# Patient Record
Sex: MALE | Race: WHITE | HISPANIC OR LATINO | URBAN - METROPOLITAN AREA
[De-identification: names, ages, dates, MRNs, and addresses within clinical notes are randomized per-mention and may not be internally consistent; named-entity substitution may affect disease eponyms.]

---

## 2019-08-06 ENCOUNTER — INPATIENT (INPATIENT)
Facility: HOSPITAL | Age: 54
LOS: 1 days | Discharge: HOME | DRG: 69 | End: 2019-08-08
Attending: PSYCHIATRY & NEUROLOGY | Admitting: PSYCHIATRY & NEUROLOGY
Payer: COMMERCIAL

## 2019-08-06 VITALS
RESPIRATION RATE: 18 BRPM | WEIGHT: 190.04 LBS | OXYGEN SATURATION: 98 % | HEIGHT: 75 IN | TEMPERATURE: 98 F | DIASTOLIC BLOOD PRESSURE: 94 MMHG | HEART RATE: 67 BPM | SYSTOLIC BLOOD PRESSURE: 146 MMHG

## 2019-08-06 DIAGNOSIS — M54.12 RADICULOPATHY, CERVICAL REGION: ICD-10-CM

## 2019-08-06 DIAGNOSIS — E87.1 HYPO-OSMOLALITY AND HYPONATREMIA: ICD-10-CM

## 2019-08-06 DIAGNOSIS — Z98.890 OTHER SPECIFIED POSTPROCEDURAL STATES: Chronic | ICD-10-CM

## 2019-08-06 DIAGNOSIS — R63.8 OTHER SYMPTOMS AND SIGNS CONCERNING FOOD AND FLUID INTAKE: ICD-10-CM

## 2019-08-06 DIAGNOSIS — Z91.89 OTHER SPECIFIED PERSONAL RISK FACTORS, NOT ELSEWHERE CLASSIFIED: ICD-10-CM

## 2019-08-06 DIAGNOSIS — R09.82 POSTNASAL DRIP: ICD-10-CM

## 2019-08-06 DIAGNOSIS — G45.9 TRANSIENT CEREBRAL ISCHEMIC ATTACK, UNSPECIFIED: ICD-10-CM

## 2019-08-06 LAB
ALBUMIN SERPL ELPH-MCNC: 4.4 G/DL — SIGNIFICANT CHANGE UP (ref 3.3–5)
ALP SERPL-CCNC: 67 U/L — SIGNIFICANT CHANGE UP (ref 40–120)
ALT FLD-CCNC: 27 U/L — SIGNIFICANT CHANGE UP (ref 10–45)
ANION GAP SERPL CALC-SCNC: 11 MMOL/L — SIGNIFICANT CHANGE UP (ref 5–17)
APTT BLD: 29.9 SEC — SIGNIFICANT CHANGE UP (ref 27.5–36.3)
AST SERPL-CCNC: 25 U/L — SIGNIFICANT CHANGE UP (ref 10–40)
BASOPHILS # BLD AUTO: 0.09 K/UL — SIGNIFICANT CHANGE UP (ref 0–0.2)
BASOPHILS NFR BLD AUTO: 1 % — SIGNIFICANT CHANGE UP (ref 0–2)
BILIRUB SERPL-MCNC: 0.9 MG/DL — SIGNIFICANT CHANGE UP (ref 0.2–1.2)
BUN SERPL-MCNC: 13 MG/DL — SIGNIFICANT CHANGE UP (ref 7–23)
CALCIUM SERPL-MCNC: 8.7 MG/DL — SIGNIFICANT CHANGE UP (ref 8.4–10.5)
CHLORIDE SERPL-SCNC: 95 MMOL/L — LOW (ref 96–108)
CHOLEST SERPL-MCNC: 221 MG/DL — HIGH (ref 10–199)
CO2 SERPL-SCNC: 25 MMOL/L — SIGNIFICANT CHANGE UP (ref 22–31)
CREAT SERPL-MCNC: 0.84 MG/DL — SIGNIFICANT CHANGE UP (ref 0.5–1.3)
EOSINOPHIL # BLD AUTO: 1 K/UL — HIGH (ref 0–0.5)
EOSINOPHIL NFR BLD AUTO: 11 % — HIGH (ref 0–6)
GLUCOSE SERPL-MCNC: 138 MG/DL — HIGH (ref 70–99)
HCT VFR BLD CALC: 42.3 % — SIGNIFICANT CHANGE UP (ref 39–50)
HDLC SERPL-MCNC: 66 MG/DL — SIGNIFICANT CHANGE UP
HGB BLD-MCNC: 14.2 G/DL — SIGNIFICANT CHANGE UP (ref 13–17)
IMM GRANULOCYTES NFR BLD AUTO: 0.2 % — SIGNIFICANT CHANGE UP (ref 0–1.5)
INR BLD: 0.99 — SIGNIFICANT CHANGE UP (ref 0.88–1.16)
LIPID PNL WITH DIRECT LDL SERPL: 143 MG/DL — HIGH
LYMPHOCYTES # BLD AUTO: 3.15 K/UL — SIGNIFICANT CHANGE UP (ref 1–3.3)
LYMPHOCYTES # BLD AUTO: 34.7 % — SIGNIFICANT CHANGE UP (ref 13–44)
MCHC RBC-ENTMCNC: 30.1 PG — SIGNIFICANT CHANGE UP (ref 27–34)
MCHC RBC-ENTMCNC: 33.6 GM/DL — SIGNIFICANT CHANGE UP (ref 32–36)
MCV RBC AUTO: 89.6 FL — SIGNIFICANT CHANGE UP (ref 80–100)
MONOCYTES # BLD AUTO: 0.6 K/UL — SIGNIFICANT CHANGE UP (ref 0–0.9)
MONOCYTES NFR BLD AUTO: 6.6 % — SIGNIFICANT CHANGE UP (ref 2–14)
NEUTROPHILS # BLD AUTO: 4.23 K/UL — SIGNIFICANT CHANGE UP (ref 1.8–7.4)
NEUTROPHILS NFR BLD AUTO: 46.5 % — SIGNIFICANT CHANGE UP (ref 43–77)
NRBC # BLD: 0 /100 WBCS — SIGNIFICANT CHANGE UP (ref 0–0)
PLATELET # BLD AUTO: 300 K/UL — SIGNIFICANT CHANGE UP (ref 150–400)
POTASSIUM SERPL-MCNC: 4 MMOL/L — SIGNIFICANT CHANGE UP (ref 3.5–5.3)
POTASSIUM SERPL-SCNC: 4 MMOL/L — SIGNIFICANT CHANGE UP (ref 3.5–5.3)
PROT SERPL-MCNC: 7 G/DL — SIGNIFICANT CHANGE UP (ref 6–8.3)
PROTHROM AB SERPL-ACNC: 11.2 SEC — SIGNIFICANT CHANGE UP (ref 10–12.9)
RBC # BLD: 4.72 M/UL — SIGNIFICANT CHANGE UP (ref 4.2–5.8)
RBC # FLD: 12.4 % — SIGNIFICANT CHANGE UP (ref 10.3–14.5)
SODIUM SERPL-SCNC: 131 MMOL/L — LOW (ref 135–145)
TOTAL CHOLESTEROL/HDL RATIO MEASUREMENT: 3.3 RATIO — LOW (ref 3.4–9.6)
TRIGL SERPL-MCNC: 60 MG/DL — SIGNIFICANT CHANGE UP (ref 10–149)
WBC # BLD: 9.09 K/UL — SIGNIFICANT CHANGE UP (ref 3.8–10.5)
WBC # FLD AUTO: 9.09 K/UL — SIGNIFICANT CHANGE UP (ref 3.8–10.5)

## 2019-08-06 PROCEDURE — 70498 CT ANGIOGRAPHY NECK: CPT | Mod: 26

## 2019-08-06 PROCEDURE — 99285 EMERGENCY DEPT VISIT HI MDM: CPT

## 2019-08-06 PROCEDURE — 93010 ELECTROCARDIOGRAM REPORT: CPT

## 2019-08-06 PROCEDURE — 0042T: CPT

## 2019-08-06 PROCEDURE — 70450 CT HEAD/BRAIN W/O DYE: CPT | Mod: 26,59

## 2019-08-06 PROCEDURE — 70496 CT ANGIOGRAPHY HEAD: CPT | Mod: 26

## 2019-08-06 RX ORDER — DIPHENHYDRAMINE HCL 50 MG
0 CAPSULE ORAL
Qty: 0 | Refills: 0 | DISCHARGE

## 2019-08-06 RX ORDER — ALBUTEROL 90 UG/1
1 AEROSOL, METERED ORAL
Qty: 0 | Refills: 0 | DISCHARGE

## 2019-08-06 RX ORDER — TADALAFIL 10 MG/1
1 TABLET, FILM COATED ORAL
Qty: 0 | Refills: 0 | DISCHARGE

## 2019-08-06 RX ORDER — CETIRIZINE HYDROCHLORIDE 10 MG/1
0 TABLET ORAL
Qty: 0 | Refills: 0 | DISCHARGE

## 2019-08-06 RX ORDER — CLOPIDOGREL BISULFATE 75 MG/1
75 TABLET, FILM COATED ORAL DAILY
Refills: 0 | Status: DISCONTINUED | OUTPATIENT
Start: 2019-08-07 | End: 2019-08-08

## 2019-08-06 RX ORDER — ASPIRIN/CALCIUM CARB/MAGNESIUM 324 MG
81 TABLET ORAL DAILY
Refills: 0 | Status: DISCONTINUED | OUTPATIENT
Start: 2019-08-07 | End: 2019-08-08

## 2019-08-06 RX ORDER — ENOXAPARIN SODIUM 100 MG/ML
40 INJECTION SUBCUTANEOUS EVERY 24 HOURS
Refills: 0 | Status: DISCONTINUED | OUTPATIENT
Start: 2019-08-06 | End: 2019-08-08

## 2019-08-06 RX ORDER — CLOPIDOGREL BISULFATE 75 MG/1
75 TABLET, FILM COATED ORAL ONCE
Refills: 0 | Status: COMPLETED | OUTPATIENT
Start: 2019-08-06 | End: 2019-08-06

## 2019-08-06 RX ORDER — ASPIRIN/CALCIUM CARB/MAGNESIUM 324 MG
81 TABLET ORAL ONCE
Refills: 0 | Status: COMPLETED | OUTPATIENT
Start: 2019-08-06 | End: 2019-08-06

## 2019-08-06 RX ORDER — IPRATROPIUM/ALBUTEROL SULFATE 18-103MCG
3 AEROSOL WITH ADAPTER (GRAM) INHALATION ONCE
Refills: 0 | Status: COMPLETED | OUTPATIENT
Start: 2019-08-06 | End: 2019-08-06

## 2019-08-06 RX ORDER — FLUTICASONE PROPIONATE 50 MCG
1 SPRAY, SUSPENSION NASAL
Refills: 0 | Status: DISCONTINUED | OUTPATIENT
Start: 2019-08-06 | End: 2019-08-08

## 2019-08-06 RX ORDER — ATORVASTATIN CALCIUM 80 MG/1
80 TABLET, FILM COATED ORAL AT BEDTIME
Refills: 0 | Status: DISCONTINUED | OUTPATIENT
Start: 2019-08-07 | End: 2019-08-08

## 2019-08-06 RX ORDER — ATORVASTATIN CALCIUM 80 MG/1
80 TABLET, FILM COATED ORAL ONCE
Refills: 0 | Status: COMPLETED | OUTPATIENT
Start: 2019-08-06 | End: 2019-08-06

## 2019-08-06 RX ADMIN — ATORVASTATIN CALCIUM 80 MILLIGRAM(S): 80 TABLET, FILM COATED ORAL at 20:31

## 2019-08-06 RX ADMIN — Medication 3 MILLILITER(S): at 23:30

## 2019-08-06 RX ADMIN — CLOPIDOGREL BISULFATE 75 MILLIGRAM(S): 75 TABLET, FILM COATED ORAL at 20:31

## 2019-08-06 RX ADMIN — Medication 81 MILLIGRAM(S): at 20:31

## 2019-08-06 RX ADMIN — ENOXAPARIN SODIUM 40 MILLIGRAM(S): 100 INJECTION SUBCUTANEOUS at 22:05

## 2019-08-06 RX ADMIN — Medication 1 SPRAY(S): at 23:46

## 2019-08-06 NOTE — ED ADULT NURSE NOTE - CHIEF COMPLAINT QUOTE
patient BIBA from urgent care. he states that a little after 5 PM today he had right eye vision change and could not understand the emails he was reading. patient went to urgent care and was sent to ER for eval. he states now that he feels  generalized weakness but vision and confusion symptoms have resolved. he states that he recently had blood work done and was told he is hyponatremic but denies any other significant medical problems.

## 2019-08-06 NOTE — ED PROVIDER NOTE - OBJECTIVE STATEMENT
Pt w/ PMHx hyponatremia, cervical radiculopathy, p/w feeling disoriented. Sx onset began at 5pm - pt states he had just finished a business phone call in his normal state of health, when he was reading his emails, and couldn't understand what they were saying. Pt called his girlfriend whom the pt states she said he was confused, she couldn't understand him, and advised him to come to the ED. Pt noted R eye vision change, described as being unable to see because of a bright light in his eye, which lasted seconds. No double vision or vision loss. No HA or neck pain. No vertigo. Pt reports L hand tingling from radiculopathy, which appears more pronounced today. No weakness. Sx lasted approx 1.5 hours and have since resolved.

## 2019-08-06 NOTE — ED PROVIDER NOTE - CLINICAL SUMMARY MEDICAL DECISION MAKING FREE TEXT BOX
Pt p/w disorientation, vision changes, confusion, resolved. DDx includes but not limited to TIA, CVA, toxic / metabolic encephalopathy, electrolyte abnormalities, sz d/o, other pathology. Check labs, EKG, CXR, CTH/CTA/CTP. Stroke code activated in accordance w/ [facility] protocols. Dispo pending w/u, stroke recommendations

## 2019-08-06 NOTE — H&P ADULT - ATTENDING COMMENTS
Patient seen and examined with [facility] team.  Agree with above  See progress note for further recommendations.

## 2019-08-06 NOTE — H&P ADULT - NSHPLABSRESULTS_GEN_ALL_CORE
.  LABS:                         14.2   9.09  )-----------( 300      ( 06 Aug 2019 19:13 )             42.3     08-06    131<L>  |  95<L>  |  13  ----------------------------<  138<H>  4.0   |  25  |  0.84    Ca    8.7      06 Aug 2019 19:13    TPro  7.0  /  Alb  4.4  /  TBili  0.9  /  DBili  x   /  AST  25  /  ALT  27  /  AlkPhos  67  08-06    PT/INR - ( 06 Aug 2019 19:13 )   PT: 11.2 sec;   INR: 0.99          PTT - ( 06 Aug 2019 19:13 )  PTT:29.9 sec  Urinalysis Basic - ( 06 Aug 2019 22:17 )    Color: Yellow / Appearance: Clear / SG: <=1.005 / pH: x  Gluc: x / Ketone: NEGATIVE  / Bili: Negative / Urobili: 0.2 E.U./dL   Blood: x / Protein: NEGATIVE mg/dL / Nitrite: NEGATIVE   Leuk Esterase: NEGATIVE / RBC: x / WBC x   Sq Epi: x / Non Sq Epi: x / Bacteria: x        RADIOLOGY, EKG & ADDITIONAL TESTS: Reviewed.

## 2019-08-06 NOTE — H&P ADULT - NSHPPHYSICALEXAM_GEN_ALL_CORE
General: Nad, resting comfortably in bed  HEENT: PEERL, EOMI, anicteric sclera, MMM  Respiratory: CTA b/l, no wheezes, rales, or rhonchi  Cardiovascular: +RRR, +S1/S2, no murmurs, rubs, or gallops  Gastrointestinal: Soft, nontender, nondistended, normoactive bowel sounds x4 quadrants  Extremities: WWP, no erythema, no peripheral edema, no cyanosis b/l  Neurological:  - Mental Status: AAOx3; speech is fluent  - Cranial Nerves II - XII:  II: PEERLA; visual fields are full to confrontation  III, IV, VI: EOMI, no nystagmus appreciated  V: facial sensation intact to light touch V1-V3 b/l  VII: no ptosis, no facial droop  VIII: hearing intact to finger rub b/l  XI: head turning and shoulder shrug intact b/l  XII: tongue protrusion midline  - Motor: 5/5  strength, strength is 5/5 b/l LLE & RLE, 5/5 b/l LUE & RUE. normal muscle bulk and tone throughout, no pronator drift  No dysmetria on exam

## 2019-08-06 NOTE — H&P ADULT - NSHPSOCIALHISTORY_GEN_ALL_CORE
Single, lives alone, drinks 2-3 glasses of wine per day. Previous smoker w/ ~ 20 pack year hx, quit 5 years ago.  of [employer] ([employer])

## 2019-08-06 NOTE — ED ADULT NURSE NOTE - OBJECTIVE STATEMENT
Pt reports "light in my right side of my vision" and "I was reading but didn't understand what I was reading" starting around 5pm that resolved around 6:30pm. Pt denies any symptoms at this time.

## 2019-08-06 NOTE — ED PROVIDER NOTE - PHYSICAL EXAMINATION
Constitutional: Well appearing, well nourished, awake, alert, oriented to person, place, time/situation and in no apparent distress.  ENMT: Airway patent. Normal MM  Eyes: Clear bilaterally  Cardiac: Normal rate, regular rhythm.  Heart sounds S1, S2.  No murmurs, rubs or gallops.  Respiratory: Breaths sounds equal and clear b/l. No increased WOB, tachypnea, hypoxia, or accessory mm use. Pt speaks in full sentences.   Gastrointestinal: Abd soft, NT, ND, NABS. No guarding, rebound, or rigidity. No pulsatile abdominal masses. No organomegaly appreciated. No CVAT   Musculoskeletal: Range of motion is not limited  Neuro: See NIHSS  Skin: Skin normal color for race, warm, dry and intact. No evidence of rash.  Psych: Alert and oriented to person, place, time/situation. normal mood and affect. no apparent risk to self or others.

## 2019-08-06 NOTE — H&P ADULT - HISTORY OF PRESENT ILLNESS
Mr. Howard is a 54 year old male with a PMHx of hyponatremia (worked up as an outpatient), cervical radiculopathy with paresthesias of the L hand, post nasal drip (takes zyrtec, benadryl, and inhaler), and tobacco use disorder (~20 pack years, quit 5 years ago, screening CT chest 1 year ago). Today, he was working when at 5:30 pm he noticed a bright light in his right eye, and had difficulty understanding his emails. He called his girlfriend, who could not understand him, and she advised him to go to the ED. He decided to walk to an urgent care center & felt "not quite drunk" while walking there. While waiting at the urgent care, his symptoms resolved (duration ~1.5 hrs). He was referred to the [facility] ED from the urgent care center.    ED Course: Stroke code initiated. Received aspirin 81, plavix 75, & atorvastatin 80, CT head (negative), UA negative, BMP w/Na 131 & Cl 95.     Arrived @ the floor & given lovenox 40 subcutaneous. Mr. Howard is a 54 year old male with a PMHx of hyponatremia (worked up as an outpatient), cervical radiculopathy with paresthesias of the L hand, post nasal drip (takes zyrtec, benadryl, and inhaler), and tobacco use disorder (~20 pack years, quit 5 years ago, screening CT chest 1 year ago). Today, he was working when at 5:30 pm he noticed a bright light in his right eye, and had difficulty understanding his emails. He called his girlfriend, who could not understand him, and she advised him to go to the ED. He decided to walk to an urgent care center & felt "not quite drunk" while walking there. While waiting at the urgent care, his symptoms resolved (duration ~1.5 hrs). He was referred to the [facility] ED from the urgent care center.    ED Course: VS HR 67, /94, RR 18, SpO2 98% on room air. Stroke code initiated. Received aspirin 81, plavix 75, & atorvastatin 80, CT (negative for acute hemorrhage or perfusion defect), UA negative, BMP w/Na 131 & Cl 95.     Arrived @ the floor & given lovenox 40 sc, albuterol, and fluticasone.

## 2019-08-06 NOTE — CONSULT NOTE ADULT - SUBJECTIVE AND OBJECTIVE BOX
**STROKE CODE CONSULT NOTE**    Last known well time/Time of onset of symptoms:    HPI:    PAST MEDICAL & SURGICAL HISTORY:      FAMILY HISTORY:      SOCIAL HISTORY:  Denies smoking, drinking, or drug use    ROS:  Constitutional: No fever, weight loss or fatigue  Eyes: No eye pain, visual disturbances, or discharge  ENMT:  No difficulty hearing, tinnitus, vertigo; No sinus or throat pain  Neck: No pain or stiffness  Respiratory: No cough, wheezing, chills or hemoptysis  Cardiovascular: No chest pain, palpitations, shortness of breath, dizziness or leg swelling  Gastrointestinal: No abdominal pain. No nausea, vomiting or hematemesis; No diarrhea or constipation. Nohematochezia.  Genitourinary: No dysuria, frequency, hematuria or incontinence  Neurological: As per HPI  Skin: No itching, burning, rashes or lesions   Endocrine: No heat or cold intolerance; No hair loss  Musculoskeletal: No joint pain or swelling; No muscle, back or extremity pain  Psychiatric: No depression, anxiety, mood swings or difficulty sleeping  Heme/Lymph: No easy bruising or bleeding gums    MEDICATIONS  (STANDING):    MEDICATIONS  (PRN):      Allergies    No Known Allergies    Intolerances        Vital Signs Last 24 Hrs  T(C): 36.7 (06 Aug 2019 18:57), Max: 36.7 (06 Aug 2019 18:57)  T(F): 98 (06 Aug 2019 18:57), Max: 98 (06 Aug 2019 18:57)  HR: 62 (06 Aug 2019 20:51) (62 - 67)  BP: 136/88 (06 Aug 2019 20:51) (136/88 - 146/94)  BP(mean): --  RR: 16 (06 Aug 2019 20:51) (16 - 18)  SpO2: 97% (06 Aug 2019 20:51) (97% - 98%)    PHYSICAL EXAM:  Constitutional: WDWN; NAD  Cardiovascular: RRR, no appreciable murmurs; no carotid bruits  Neurologic:  -Mental status: Awake, alert and oriented to person, place, and time. Speech is fluent with intact naming, repetition, and comprehension.  Recent and remote memory intact.  Attention/concentration intact.  No dysarthria, no aphasia.  Fund of knowledge appropriate.    -Cranial nerves:  II: Visual fields full to confrontation. Pupils PERRL  III, IV, VI: extraocular movements are intact without nystagmus  V: Facial sensation intact V1 through V3 intact bilaterally  VII: Face is symmetric with normal eye closure and smile, no facial droop.  VIII: hearing is intact to finger rub  IX, X: uvula is midline and soft palate rises symmetrically  XI: Head turning and shoulder shrug intact  XII: Tongue protrudes in the midline    -Motor:  Normal bulk and tone, strength 5/5 in bilateral upper and lower extremities.   strength 5/5.  Rapid alternating movements intact and symmetric. No pronator drift.   -Sensation: Intact to light touch, proprioception, and pinprick.  Intact pain and temperature sense. No neglect. Romberg negative.  -Coordination: No dysmetria on finger-to-nose and heel-to-shin.  No clumsiness.  -Reflexes: 2+ in upper and lower extremities, downgoing toes bilaterally  -Gait: Narrow and steady. No ataxia. Able to perform tandem and heel to toe walk.    NIHSS:    Fingerstick Blood Glucose: CAPILLARY BLOOD GLUCOSE      POCT Blood Glucose.: 132 mg/dL (06 Aug 2019 18:54)       LABS:                        14.2   9.09  )-----------( 300      ( 06 Aug 2019 19:13 )             42.3     08-06    131<L>  |  95<L>  |  13  ----------------------------<  138<H>  4.0   |  25  |  0.84    Ca    8.7      06 Aug 2019 19:13    TPro  7.0  /  Alb  4.4  /  TBili  0.9  /  DBili  x   /  AST  25  /  ALT  27  /  AlkPhos  67  08-06    PT/INR - ( 06 Aug 2019 19:13 )   PT: 11.2 sec;   INR: 0.99          PTT - ( 06 Aug 2019 19:13 )  PTT:29.9 sec          RADIOLOGY & ADDITIONAL STUDIES:    IV-tPA (Y/N):                                   Bolus time:  Reason IV-tPA not given:    ASSESSMENT/PLAN:    54y yo Male w/ PMH coming in with      1)Admit to Stroke unit  -Start Aspirin 81 and Plavix 75  -Start Atorvastatin 80    2) Labs: Obtain Hemoglobin A1c, Lipid profile set, and TSH    3) Other tests:  -MRI  -PETRA (NPO after midnight)  -SBP goal 160-200  -PT and OT eval  -Please perform dysphagia screen    4)DVT ppx - Heparin SQ and SCDs **STROKE CODE CONSULT NOTE**    Last known well time/Time of onset of symptoms: 8/6/19 17:00    HPI: 54 year old male with pmhx hyponatremia (worked up as an outpatient), cervical radiculopathy with paresthesias of the L hand, post nasal drip (takes zyrtec, benadryl, and inhaler), and tobacco use disorder (~20 pack years, quit 5 years ago, screening CT chest 1 year ago). Today, he was working when at 5:30 pm he noticed a bright light in his right eye, and had difficulty understanding his emails. He called his girlfriend, who could not understand him, and she advised him to go to the ED. He decided to walk to an urgent care center & felt "not quite drunk" while walking there. While waiting at the urgent care, his symptoms resolved (duration ~1.5 hrs). He was referred to the [facility] ED from the urgent care center. Stroke code called upon arrival, stroke team arrived, pt brought to CT. CT head noncontrast negative for evidence of acute infarct or acute hemorrhage. CTA normal, CTP normal. On eval, pt with /94, . Denied any current symptoms, was able to talk coherently without word finding difficulties. Denies HA, numbness, weakness, dizziness, current vision changes, nausea or vomiting.    PAST MEDICAL & SURGICAL HISTORY:  hyponatremia  cervical radiculopathy    FAMILY HISTORY:      SOCIAL HISTORY:  20 pack year smoking history, quit 5 years ago. reports occasional drink, denies illicit drug use.     ROS:  Constitutional: No fever, weight loss or fatigue  Eyes: No eye pain, visual disturbances, or discharge  ENMT:  No difficulty hearing, tinnitus, vertigo; No sinus or throat pain  Neck: No pain or stiffness  Respiratory: No cough, wheezing, chills or hemoptysis  Cardiovascular: No chest pain, palpitations, shortness of breath, dizziness or leg swelling  Gastrointestinal: No abdominal pain. No nausea, vomiting or hematemesis; No diarrhea or constipation. Nohematochezia.  Genitourinary: No dysuria, frequency, hematuria or incontinence  Neurological: As per HPI  Skin: No itching, burning, rashes or lesions   Endocrine: No heat or cold intolerance; No hair loss  Musculoskeletal: No joint pain or swelling; No muscle, back or extremity pain  Psychiatric: No depression, anxiety, mood swings or difficulty sleeping  Heme/Lymph: No easy bruising or bleeding gums    MEDICATIONS  (STANDING):    MEDICATIONS  (PRN):      Allergies    No Known Allergies    Intolerances        Vital Signs Last 24 Hrs  T(C): 36.7 (06 Aug 2019 18:57), Max: 36.7 (06 Aug 2019 18:57)  T(F): 98 (06 Aug 2019 18:57), Max: 98 (06 Aug 2019 18:57)  HR: 62 (06 Aug 2019 20:51) (62 - 67)  BP: 136/88 (06 Aug 2019 20:51) (136/88 - 146/94)  BP(mean): --  RR: 16 (06 Aug 2019 20:51) (16 - 18)  SpO2: 97% (06 Aug 2019 20:51) (97% - 98%)    PHYSICAL EXAM:  Constitutional: WDWN; NAD  Cardiovascular: RRR, no appreciable murmurs; no carotid bruits  Neurologic:  -Mental status: Awake, alert and oriented to person, place, and time. Speech is fluent with intact naming, repetition, and comprehension.  Recent and remote memory intact.  Attention/concentration intact.  No dysarthria, no aphasia.  Fund of knowledge appropriate.    -Cranial nerves:  II: Visual fields full to confrontation. Pupils PERRL  III, IV, VI: extraocular movements are intact without nystagmus  V: Facial sensation intact V1 through V3 intact bilaterally  VII: Face is symmetric with normal eye closure and smile, no facial droop.  VIII: hearing is intact to finger rub  -Motor:  Normal bulk and tone, strength 5/5 in bilateral upper and lower extremities.   strength 5/5.  Rapid alternating movements intact and symmetric. No pronator drift.   -Sensation: Intact to light touch, proprioception, and pinprick.  Intact pain and temperature sense. No neglect. Romberg negative.  -Coordination: No dysmetria on finger-to-nose and heel-to-shin.  No clumsiness.  -Reflexes: 2+ in upper and lower extremities, downgoing toes bilaterally  -Gait: Narrow and steady. No ataxia. Able to perform tandem and heel to toe walk.    NIHSS: 0    Fingerstick Blood Glucose: CAPILLARY BLOOD GLUCOSE  POCT Blood Glucose.: 132 mg/dL (06 Aug 2019 18:54)       LABS:                        14.2   9.09  )-----------( 300      ( 06 Aug 2019 19:13 )             42.3     08-06    131<L>  |  95<L>  |  13  ----------------------------<  138<H>  4.0   |  25  |  0.84    Ca    8.7      06 Aug 2019 19:13    TPro  7.0  /  Alb  4.4  /  TBili  0.9  /  DBili  x   /  AST  25  /  ALT  27  /  AlkPhos  67  08-06    PT/INR - ( 06 Aug 2019 19:13 )   PT: 11.2 sec;   INR: 0.99          PTT - ( 06 Aug 2019 19:13 )  PTT:29.9 sec          RADIOLOGY & ADDITIONAL STUDIES:  < from: CT Brain Stroke Protocol (08.06.19 @ 19:28) >  IMPRESSION:   No acute intracranial hemorrhage or acute transcortical infarct  < end of copied text >    < from: CT Perfusion w/ Maps w/ IV Cont (08.06.19 @ 19:30) >  IMPRESSION:   No perfusion mismatch identified. Unremarkable CT perfusion study.  < end of copied text >    < from: CT Angio Head/Neck w/ IV Cont (08.06.19 @ 19:28) >  IMPRESSION:    CTA COW:  No large vessel occlusion or significant stenosis of the intracranial   [place].    CTA NECK:  1. No carotid bifurcation stenosis.  2. No vertebral artery stenosis.  < end of copied text >    IV-tPA (Y/N):          N                         Bolus time:  Reason IV-tPA not given: Rapidly improving neurological deficit    ASSESSMENT/PLAN:    54y yo Male w/ PMH hyponatremia, cervical radiculopathy with paresthesias of the L hand, post nasal drip, and tobacco use disorder (~20 pack years, quit 5 years ago, screening CT chest 1 year ago) presenting with 1.5 hours of aphasia and dysarthria, symptoms resolved prior to arrival to [facility]. CT head negative for acute infarct or acute hemorrhage, CTA normal, CTP normal. Consider TIA vs new onset seizure.     1)Admit to Stroke unit  -Start Aspirin 81 and Plavix 75  -Start Atorvastatin 80  - No load necessary    2) Labs: Obtain Hemoglobin A1c, Lipid profile set, and TSH    3) Other tests:  -MRI  -TTE with bubble  - video EEG  -SBP goal 160-200  -PT and OT eval  -Dysphagia screen- passed in ED    4)DVT ppx - Heparin SQ and SCDs

## 2019-08-06 NOTE — ED ADULT NURSE NOTE - NSIMPLEMENTINTERV_GEN_ALL_ED
Implemented All Universal Safety Interventions:  [place] to call system. Call bell, personal items and telephone within reach. Instruct patient to call for assistance. Room bathroom lighting operational. Non-slip footwear when patient is off stretcher. Physically safe environment: no spills, clutter or unnecessary equipment. Stretcher in lowest position, wheels locked, appropriate side rails in place.

## 2019-08-06 NOTE — ED ADULT NURSE NOTE - CHPI ED NUR SYMPTOMS NEG
no loss of consciousness/no dizziness/no nausea/no change in level of consciousness/no confusion/no blurred vision/no numbness/no vomiting/no fever/no weakness

## 2019-08-06 NOTE — H&P ADULT - PROBLEM SELECTOR PLAN 3
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 Worked up as an outpatient, patient unaware of diagnosis.   -F/u outpatient provider for cancer screening results, CT chest negative 1 year ago 1 year history of SIADH, also present on outpatient physical 3 weeks ago. Worked up as an outpatient, patient unaware of diagnosis.   -F/u outpatient provider for cancer screening results, CT chest negative 1 year ago  -F/u AM BMP  Rule out seizure  -VEEG monitor 1 year history of hyponatremia, also present on outpatient physical 3 weeks ago. Worked up as an outpatient, patient unaware of diagnosis. Likely idiopathic SIADH vs medication induced   -F/u outpatient provider  -CT chest negative 1 year ago  -F/u AM BMP  Rule out seizure  -VEEG monitor 1 year history of hyponatremia, also present on outpatient physical 3 weeks ago. Worked up as an outpatient, patient unaware of exact diagnosis. Reports prior imaging and recommended fluid restriction. 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   -F/u outpatient provider  -CT chest negative 1 year ago  -F/u AM BMPqd   -VEEG monitor to rule out seizure

## 2019-08-06 NOTE — H&P ADULT - NSHPREVIEWOFSYSTEMS_GEN_ALL_CORE
REVIEW OF SYSTEMS:    CONSTITUTIONAL: No weakness, fevers or chills  EYES/ENT: + visual changes (resolved);  No vertigo  + throat pain (post nasal drip)  NECK: No pain or stiffness  RESPIRATORY: No cough, wheezing, hemoptysis; No shortness of breath  CARDIOVASCULAR: No chest pain or palpitations  GASTROINTESTINAL: No abdominal or epigastric pain. No nausea, vomiting, or hematemesis; No diarrhea or constipation. No melena or hematochezia.  GENITOURINARY: No dysuria, frequency or hematuria  NEUROLOGICAL: No numbness or weakness, + paresthesia of L hand (c/w PMHx of cervical radiculopathy)  SKIN: No itching, rashes

## 2019-08-06 NOTE — H&P ADULT - PROBLEM SELECTOR PLAN 1
CT Head negative & complete resolution of symptoms  -Atorvastatin 80, Plavix 75, Aspirin 81  - CT Head negative & complete resolution of symptoms  -Atorvastatin 80, Plavix 75, Aspirin 81  -MRI  -f/u TTE with bubble  -f/u video EEG  -f/u SBP goal 160-200  -f/u PT and OT eval  -f/u Dysphagia screen- passed in ED Pt presenting with symptoms concerning for TIA as he had symptoms of expressive aphasia, where the words he was speaking were not understood as well as transient visual sensation of white light. Stroke code called. CT Head negative & complete resolution of symptoms.   -S/p Atorvastatin 80, Plavix 75, Aspirin 81 to be continued   -F/u MRI-head non contrast   -f/u TTE with bubble  -f/u video EEG  -f/u SBP goal 160-200  -f/u PT and OT eval  -f/u Dysphagia screen- passed in ED  - C/w neurological [id number]

## 2019-08-06 NOTE — H&P ADULT - PROBLEM SELECTOR PLAN 5
Fluids: none  Electrolytes: replete as necessary, K>4, Mg>2  Nutrition: Regular diet  Bowel Regimen:  DVT ppx: loveonx 40 mg sc qd  Code: Full  Disposition: [facility] Fluids: none  Electrolytes: replete as necessary, K>4, Mg>2  Nutrition: DASH/TLC diet  DVT ppx: loveonx 40 mg sc qd  Code: Full  Disposition: [facility]

## 2019-08-06 NOTE — H&P ADULT - ASSESSMENT
54 year old male w/PMHx of tobacco use d/o, hyponatremia, cervical radiculopathy, & post-nasal drip, presenting to [facility] after an episode of a bright light in his right eye and confusion (unable to understand emails) for 1.5 hrs. 54 year old male w/PMHx of tobacco use d/o, hyponatremia, cervical radiculopathy, & post-nasal drip, presenting to [facility] after an episode of a bright light in his right eye and confusion (unable to understand emails) for 1.5 hrs, admitted for workup of TIA.

## 2019-08-06 NOTE — H&P ADULT - PROBLEM SELECTOR PLAN 6
-PCP Contacted on Admission: (Y/N) --> Name & Phone #:  -Date of Contact with PCP:  -PCP Contacted at Discharge: (Y/N, N/A)  -Summary of Handoff Given to PCP:   -Post-Discharge Appointment Date and Location: -PCP Contacted on Admission: (Y/N) --> Name & Phone #: Dr. Birgit Alvarado [phone]  -Date of Contact with PCP:  -PCP Contacted at Discharge: (Y/N, N/A)  -Summary of Handoff Given to PCP:   -Post-Discharge Appointment Date and Location:

## 2019-08-06 NOTE — H&P ADULT - PROBLEM SELECTOR PLAN 4
Patient started taking albuterol (1-2 puffs daily), benadryl qhs, zyrtec qd for post-nasal drip & sinus symptoms.  -Start albuterol nebulizer PRN  -Start inhaled fluticasone propionate   -Consider restarting benadryl Patient started taking albuterol (1-2 puffs daily), benadryl qhs, zyrtec qd for post-nasal drip & sinus symptoms 3 weeks ago.  -Start albuterol nebulizer PRN  -Start inhaled fluticasone propionate   -Consider restarting benadryl

## 2019-08-06 NOTE — H&P ADULT - PROBLEM SELECTOR PLAN 2
-Left hand paresthesia  -Consistent with previous symptoms -Left hand paresthesia in ED  -Consistent with previous symptoms

## 2019-08-07 LAB
ANION GAP SERPL CALC-SCNC: 10 MMOL/L — SIGNIFICANT CHANGE UP (ref 5–17)
BUN SERPL-MCNC: 11 MG/DL — SIGNIFICANT CHANGE UP (ref 7–23)
CALCIUM SERPL-MCNC: 8.7 MG/DL — SIGNIFICANT CHANGE UP (ref 8.4–10.5)
CHLORIDE SERPL-SCNC: 99 MMOL/L — SIGNIFICANT CHANGE UP (ref 96–108)
CO2 SERPL-SCNC: 25 MMOL/L — SIGNIFICANT CHANGE UP (ref 22–31)
CREAT SERPL-MCNC: 1.01 MG/DL — SIGNIFICANT CHANGE UP (ref 0.5–1.3)
CRP SERPL-MCNC: 0.03 MG/DL — SIGNIFICANT CHANGE UP (ref 0–0.4)
GLUCOSE SERPL-MCNC: 99 MG/DL — SIGNIFICANT CHANGE UP (ref 70–99)
HCT VFR BLD CALC: 42.8 % — SIGNIFICANT CHANGE UP (ref 39–50)
HCV AB S/CO SERPL IA: 4.55 S/CO — SIGNIFICANT CHANGE UP
HCV AB SERPL-IMP: ABNORMAL
HGB BLD-MCNC: 14.6 G/DL — SIGNIFICANT CHANGE UP (ref 13–17)
MCHC RBC-ENTMCNC: 31.2 PG — SIGNIFICANT CHANGE UP (ref 27–34)
MCHC RBC-ENTMCNC: 34.1 GM/DL — SIGNIFICANT CHANGE UP (ref 32–36)
MCV RBC AUTO: 91.5 FL — SIGNIFICANT CHANGE UP (ref 80–100)
NRBC # BLD: 0 /100 WBCS — SIGNIFICANT CHANGE UP (ref 0–0)
PLATELET # BLD AUTO: 303 K/UL — SIGNIFICANT CHANGE UP (ref 150–400)
POTASSIUM SERPL-MCNC: 4.6 MMOL/L — SIGNIFICANT CHANGE UP (ref 3.5–5.3)
POTASSIUM SERPL-SCNC: 4.6 MMOL/L — SIGNIFICANT CHANGE UP (ref 3.5–5.3)
RBC # BLD: 4.68 M/UL — SIGNIFICANT CHANGE UP (ref 4.2–5.8)
RBC # FLD: 12.7 % — SIGNIFICANT CHANGE UP (ref 10.3–14.5)
SODIUM SERPL-SCNC: 134 MMOL/L — LOW (ref 135–145)
WBC # BLD: 7.89 K/UL — SIGNIFICANT CHANGE UP (ref 3.8–10.5)
WBC # FLD AUTO: 7.89 K/UL — SIGNIFICANT CHANGE UP (ref 3.8–10.5)

## 2019-08-07 PROCEDURE — 93312 ECHO TRANSESOPHAGEAL: CPT | Mod: 26

## 2019-08-07 PROCEDURE — 70551 MRI BRAIN STEM W/O DYE: CPT | Mod: 26

## 2019-08-07 PROCEDURE — 93306 TTE W/DOPPLER COMPLETE: CPT | Mod: 26

## 2019-08-07 PROCEDURE — 93010 ELECTROCARDIOGRAM REPORT: CPT

## 2019-08-07 PROCEDURE — 99223 1ST HOSP IP/OBS HIGH 75: CPT

## 2019-08-07 RX ORDER — ACETAMINOPHEN 500 MG
650 TABLET ORAL EVERY 6 HOURS
Refills: 0 | Status: DISCONTINUED | OUTPATIENT
Start: 2019-08-07 | End: 2019-08-08

## 2019-08-07 RX ORDER — ALBUTEROL 90 UG/1
1 AEROSOL, METERED ORAL EVERY 8 HOURS
Refills: 0 | Status: DISCONTINUED | OUTPATIENT
Start: 2019-08-07 | End: 2019-08-08

## 2019-08-07 RX ORDER — BENZOCAINE AND MENTHOL 5; 1 G/100ML; G/100ML
1 LIQUID ORAL
Refills: 0 | Status: DISCONTINUED | OUTPATIENT
Start: 2019-08-07 | End: 2019-08-08

## 2019-08-07 RX ORDER — SODIUM CHLORIDE 0.65 %
1 AEROSOL, SPRAY (ML) NASAL EVERY 4 HOURS
Refills: 0 | Status: DISCONTINUED | OUTPATIENT
Start: 2019-08-07 | End: 2019-08-08

## 2019-08-07 RX ADMIN — Medication 1 SPRAY(S): at 21:59

## 2019-08-07 RX ADMIN — BENZOCAINE AND MENTHOL 1 LOZENGE: 5; 1 LIQUID ORAL at 22:17

## 2019-08-07 RX ADMIN — Medication 650 MILLIGRAM(S): at 18:00

## 2019-08-07 RX ADMIN — Medication 650 MILLIGRAM(S): at 08:32

## 2019-08-07 RX ADMIN — ATORVASTATIN CALCIUM 80 MILLIGRAM(S): 80 TABLET, FILM COATED ORAL at 21:59

## 2019-08-07 RX ADMIN — CLOPIDOGREL BISULFATE 75 MILLIGRAM(S): 75 TABLET, FILM COATED ORAL at 19:03

## 2019-08-07 RX ADMIN — Medication 650 MILLIGRAM(S): at 09:03

## 2019-08-07 RX ADMIN — ENOXAPARIN SODIUM 40 MILLIGRAM(S): 100 INJECTION SUBCUTANEOUS at 21:59

## 2019-08-07 RX ADMIN — Medication 81 MILLIGRAM(S): at 19:03

## 2019-08-07 RX ADMIN — Medication 650 MILLIGRAM(S): at 17:28

## 2019-08-07 RX ADMIN — Medication 1 SPRAY(S): at 05:35

## 2019-08-07 RX ADMIN — Medication 1 SPRAY(S): at 18:14

## 2019-08-07 NOTE — PHYSICAL THERAPY INITIAL EVALUATION ADULT - ADDITIONAL COMMENTS
Pt lives alone in an apartment with 5 flights walk up. Prior to admission, pt ambulated independently without assistive device.

## 2019-08-07 NOTE — PHYSICAL THERAPY INITIAL EVALUATION ADULT - GENERAL OBSERVATIONS, REHAB EVAL
Pt received supine in bed with +hep-lock, +EKG, NAD. Pt left as found, NAD, call bell in reach, RN awares.

## 2019-08-07 NOTE — PROGRESS NOTE ADULT - PROBLEM SELECTOR PLAN 1
Pt presenting with symptoms concerning for TIA as he had symptoms of expressive aphasia, where the words he was speaking were not understood as well as transient visual sensation of white light. Stroke code called. CT Head negative & complete resolution of symptoms.   -S/p Atorvastatin 80, Plavix 75, Aspirin 81 to be continued   -F/u MRI-head non contrast   -f/u TTE with bubble  -f/u video EEG  -f/u SBP goal 160-200  -f/u PT and OT eval  -f/u Dysphagia screen- passed in ED  - C/w neurological [id number] Pt presenting with symptoms concerning for TIA as he had symptoms of expressive aphasia, where the words he was speaking were not understood as well as transient visual sensation of white light. Stroke code called. CT Head negative & complete resolution of symptoms.   -Continue Atorvastatin 80, Plavix 75, Aspirin 81   -F/u MRI-head non contrast   -f/u TTE with bubble  -f/u PETRA  -f/u video EEG  -f/u SBP goal 160-200  -f/u PT and OT eval  -f/u Dysphagia screen- passed in ED  -repeat EKG  - C/w neurological [id number]

## 2019-08-07 NOTE — PROGRESS NOTE ADULT - SUBJECTIVE AND OBJECTIVE BOX
Vital Signs Last 24 Hrs  T(C): 36.7 (07 Aug 2019 10:00), Max: 37.1 (07 Aug 2019 08:21)  T(F): 98 (07 Aug 2019 10:00), Max: 98.8 (07 Aug 2019 08:21)  HR: 65 (07 Aug 2019 16:05) (62 - 82)  BP: 124/75 (07 Aug 2019 16:05) (117/83 - 146/94)  BP(mean): 92 (07 Aug 2019 16:05) (88 - 97)  RR: 16 (07 Aug 2019 16:05) (16 - 18)  SpO2: 97% (07 Aug 2019 12:18) (96% - 98%)      Physical exam:  General: No acute distress, awake and alert  Cardiovascular: Regular RRR, no M/R/G. No bruits  Pulmonary: Anterior breath sounds clear bilaterally, no crackles or wheezing. No use of accessory muscles  Extremities: Radial and DP pulses +2, no edema    Neurologic:  -Mental status: Awake, alert, oriented to person, place, and time. Speech is fluent with intact naming, repetition, and comprehension, no dysarthria. Recent and remote memory intact. Follows commands. Attention/concentration intact. Fund of knowledge appropriate.  -Cranial nerves:   II: Visual fields are full to confrontation.  III, IV, VI: Extraocular movements are intact without nystagmus. Pupils equally round and reactive to light  V:  Facial sensation V1-V3 equal and intact   VII: Face is symmetric with normal eye closure and smile  VIII: Hearing is bilaterally intact to finger rub  IX, X: Uvula is midline and soft palate rises symmetrically  XI: Head turning and shoulder shrug are intact.  XII: Tongue protrudes midline  Motor: Normal bulk and tone. No pronator drift. Strength bilateral upper extremity 5/5, bilateral lower extremities 5/5.  Rapid alternating movements intact and symmetric  Sensation: Intact to light touch bilaterally. No neglect or extinction on double simultaneous testing.  Coordination: No dysmetria on finger-to-nose and heel-to-shin bilaterally  Reflexes: Downgoing toes bilaterally   Gait: Narrow gait and steady      MEDICATIONS  (STANDING):  aspirin enteric coated 81 milliGRAM(s) Oral daily  atorvastatin 80 milliGRAM(s) Oral at bedtime  clopidogrel Tablet 75 milliGRAM(s) Oral daily  enoxaparin Injectable 40 milliGRAM(s) SubCutaneous every 24 hours  fluticasone propionate 50 MICROgram(s)/spray Nasal Spray 1 Spray(s) Both Nostrils two times a day    MEDICATIONS  (PRN):  acetaminophen   Tablet .. 650 milliGRAM(s) Oral every 6 hours PRN Mild Pain (1 - 3)      LABS:                         14.6   7.89  )-----------( 303      ( 07 Aug 2019 06:30 )             42.8     08-07    134<L>  |  99  |  11  ----------------------------<  99  4.6   |  25  |  1.01    Ca    8.7      07 Aug 2019 06:30    TPro  7.0  /  Alb  4.4  /  TBili  0.9  /  DBili  x   /  AST  25  /  ALT  27  /  AlkPhos  67  08-06    PT/INR - ( 06 Aug 2019 19:13 )   PT: 11.2 sec;   INR: 0.99          PTT - ( 06 Aug 2019 19:13 )  PTT:29.9 sec  Urinalysis Basic - ( 06 Aug 2019 22:17 )    Color: Yellow / Appearance: Clear / SG: <=1.005 / pH: x  Gluc: x / Ketone: NEGATIVE  / Bili: Negative / Urobili: 0.2 E.U./dL   Blood: x / Protein: NEGATIVE mg/dL / Nitrite: NEGATIVE   Leuk Esterase: NEGATIVE / RBC: x / WBC x   Sq Epi: x / Non Sq Epi: x / Bacteria: x            RADIOLOGY, EKG & ADDITIONAL TESTS: Reviewed. Subjective: Patient seen and examined bedside. Patient states that he was doing his work yesterday and was not able to understand simple sentences in his email.        Vital Signs Last 24 Hrs  T(C): 36.7 (07 Aug 2019 10:00), Max: 37.1 (07 Aug 2019 08:21)  T(F): 98 (07 Aug 2019 10:00), Max: 98.8 (07 Aug 2019 08:21)  HR: 65 (07 Aug 2019 16:05) (62 - 82)  BP: 124/75 (07 Aug 2019 16:05) (117/83 - 146/94)  BP(mean): 92 (07 Aug 2019 16:05) (88 - 97)  RR: 16 (07 Aug 2019 16:05) (16 - 18)  SpO2: 97% (07 Aug 2019 12:18) (96% - 98%)      Physical exam:  General: No acute distress, awake and alert  Cardiovascular: Regular RRR, no M/R/G. No bruits  Pulmonary: Anterior breath sounds clear bilaterally, no crackles or wheezing. No use of accessory muscles  Extremities: Radial and DP pulses +2, no edema    Neurologic:  -Mental status: Awake, alert, oriented to person, place, and time. Speech is fluent with intact naming, repetition, and comprehension, no dysarthria. Recent and remote memory intact. Follows commands. Attention/concentration intact. Fund of knowledge appropriate.  -Cranial nerves:   II: Visual fields are full to confrontation.  III, IV, VI: Extraocular movements are intact without nystagmus. Pupils equally round and reactive to light  V:  Facial sensation V1-V3 equal and intact   VII: Face is symmetric with normal eye closure and smile  VIII: Hearing is bilaterally intact to finger rub  IX, X: Uvula is midline and soft palate rises symmetrically  XI: Head turning and shoulder shrug are intact.  XII: Tongue protrudes midline  Motor: Normal bulk and tone. No pronator drift. Strength bilateral upper extremity 5/5, bilateral lower extremities 5/5.  Rapid alternating movements intact and symmetric  Sensation: Intact to light touch bilaterally. No neglect or extinction on double simultaneous testing.  Coordination: No dysmetria on finger-to-nose and heel-to-shin bilaterally  Reflexes: Downgoing toes bilaterally   Gait: Narrow gait and steady      MEDICATIONS  (STANDING):  aspirin enteric coated 81 milliGRAM(s) Oral daily  atorvastatin 80 milliGRAM(s) Oral at bedtime  clopidogrel Tablet 75 milliGRAM(s) Oral daily  enoxaparin Injectable 40 milliGRAM(s) SubCutaneous every 24 hours  fluticasone propionate 50 MICROgram(s)/spray Nasal Spray 1 Spray(s) Both Nostrils two times a day    MEDICATIONS  (PRN):  acetaminophen   Tablet .. 650 milliGRAM(s) Oral every 6 hours PRN Mild Pain (1 - 3)      LABS:                         14.6   7.89  )-----------( 303      ( 07 Aug 2019 06:30 )             42.8     08-07    134<L>  |  99  |  11  ----------------------------<  99  4.6   |  25  |  1.01    Ca    8.7      07 Aug 2019 06:30    TPro  7.0  /  Alb  4.4  /  TBili  0.9  /  DBili  x   /  AST  25  /  ALT  27  /  AlkPhos  67  08-06    PT/INR - ( 06 Aug 2019 19:13 )   PT: 11.2 sec;   INR: 0.99          PTT - ( 06 Aug 2019 19:13 )  PTT:29.9 sec  Urinalysis Basic - ( 06 Aug 2019 22:17 )    Color: Yellow / Appearance: Clear / SG: <=1.005 / pH: x  Gluc: x / Ketone: NEGATIVE  / Bili: Negative / Urobili: 0.2 E.U./dL   Blood: x / Protein: NEGATIVE mg/dL / Nitrite: NEGATIVE   Leuk Esterase: NEGATIVE / RBC: x / WBC x   Sq Epi: x / Non Sq Epi: x / Bacteria: x            RADIOLOGY, EKG & ADDITIONAL TESTS: Reviewed. Subjective: Patient seen and examined bedside. Patient states that he was doing his work yesterday and was not able to understand simple sentences in his email. Patient feels much better today and is able to read and understand his newspaper.      Vital Signs Last 24 Hrs  T(C): 36.7 (07 Aug 2019 10:00), Max: 37.1 (07 Aug 2019 08:21)  T(F): 98 (07 Aug 2019 10:00), Max: 98.8 (07 Aug 2019 08:21)  HR: 65 (07 Aug 2019 16:05) (62 - 82)  BP: 124/75 (07 Aug 2019 16:05) (117/83 - 146/94)  BP(mean): 92 (07 Aug 2019 16:05) (88 - 97)  RR: 16 (07 Aug 2019 16:05) (16 - 18)  SpO2: 97% (07 Aug 2019 12:18) (96% - 98%)      Physical exam:  General: No acute distress, awake and alert  Cardiovascular: Regular RRR, no M/R/G. No bruits  Pulmonary: Anterior breath sounds clear bilaterally, no crackles or wheezing. No use of accessory muscles  Extremities: Radial and DP pulses +2, no edema    Neurologic:  -Mental status: Awake, alert, oriented to person, place, and time. Speech is fluent with intact naming, repetition, and comprehension, no dysarthria. Recent and remote memory intact. Follows commands. Attention/concentration intact. Fund of knowledge appropriate.  -Cranial nerves:   II: Visual fields are full to confrontation.  III, IV, VI: Extraocular movements are intact without nystagmus. Pupils equally round and reactive to light  V:  Facial sensation V1-V3 equal and intact   VII: Face is symmetric with normal eye closure and smile  VIII: Hearing is bilaterally intact to finger rub  IX, X: Uvula is midline and soft palate rises symmetrically  XI: Head turning and shoulder shrug are intact.  XII: Tongue protrudes midline  Motor: Normal bulk and tone. No pronator drift. Strength bilateral upper extremity 5/5, bilateral lower extremities 5/5.  Rapid alternating movements intact and symmetric  Sensation: Intact to light touch bilaterally. No neglect or extinction on double simultaneous testing.  Coordination: No dysmetria on finger-to-nose and heel-to-shin bilaterally  Reflexes: Downgoing toes bilaterally   Gait: Narrow gait and steady      MEDICATIONS  (STANDING):  aspirin enteric coated 81 milliGRAM(s) Oral daily  atorvastatin 80 milliGRAM(s) Oral at bedtime  clopidogrel Tablet 75 milliGRAM(s) Oral daily  enoxaparin Injectable 40 milliGRAM(s) SubCutaneous every 24 hours  fluticasone propionate 50 MICROgram(s)/spray Nasal Spray 1 Spray(s) Both Nostrils two times a day    MEDICATIONS  (PRN):  acetaminophen   Tablet .. 650 milliGRAM(s) Oral every 6 hours PRN Mild Pain (1 - 3)      LABS:                         14.6   7.89  )-----------( 303      ( 07 Aug 2019 06:30 )             42.8     08-07    134<L>  |  99  |  11  ----------------------------<  99  4.6   |  25  |  1.01    Ca    8.7      07 Aug 2019 06:30    TPro  7.0  /  Alb  4.4  /  TBili  0.9  /  DBili  x   /  AST  25  /  ALT  27  /  AlkPhos  67  08-06    PT/INR - ( 06 Aug 2019 19:13 )   PT: 11.2 sec;   INR: 0.99          PTT - ( 06 Aug 2019 19:13 )  PTT:29.9 sec  Urinalysis Basic - ( 06 Aug 2019 22:17 )    Color: Yellow / Appearance: Clear / SG: <=1.005 / pH: x  Gluc: x / Ketone: NEGATIVE  / Bili: Negative / Urobili: 0.2 E.U./dL   Blood: x / Protein: NEGATIVE mg/dL / Nitrite: NEGATIVE   Leuk Esterase: NEGATIVE / RBC: x / WBC x   Sq Epi: x / Non Sq Epi: x / Bacteria: x            RADIOLOGY, EKG & ADDITIONAL TESTS: Reviewed. Subjective: Patient seen and examined bedside. Patient states that he was doing his work yesterday and was not able to understand simple sentences in his email. Patient feels much better today and is able to read and understand his newspaper. Patient complaining of left sided headache, but has not other complaints.      Vital Signs Last 24 Hrs  T(C): 36.7 (07 Aug 2019 10:00), Max: 37.1 (07 Aug 2019 08:21)  T(F): 98 (07 Aug 2019 10:00), Max: 98.8 (07 Aug 2019 08:21)  HR: 65 (07 Aug 2019 16:05) (62 - 82)  BP: 124/75 (07 Aug 2019 16:05) (117/83 - 146/94)  BP(mean): 92 (07 Aug 2019 16:05) (88 - 97)  RR: 16 (07 Aug 2019 16:05) (16 - 18)  SpO2: 97% (07 Aug 2019 12:18) (96% - 98%)      Physical exam:  General: No acute distress, awake and alert  Cardiovascular: Regular RRR, no M/R/G. No bruits  Pulmonary: Anterior breath sounds clear bilaterally, no crackles or wheezing. No use of accessory muscles  Extremities: Radial and DP pulses +2, no edema    Neurologic:  -Mental status: Awake, alert, oriented to person, place, and time. Speech is fluent with intact naming, repetition, and comprehension, no dysarthria. Recent and remote memory intact. Follows commands. Attention/concentration intact. Fund of knowledge appropriate.  -Cranial nerves:   II: Visual fields are full to confrontation.  III, IV, VI: Extraocular movements are intact without nystagmus. Pupils equally round and reactive to light  V:  Facial sensation V1-V3 equal and intact   VII: Face is symmetric with normal eye closure and smile  VIII: Hearing is bilaterally intact to finger rub  IX, X: Uvula is midline and soft palate rises symmetrically  XI: Head turning and shoulder shrug are intact.  XII: Tongue protrudes midline  Motor: Normal bulk and tone. No pronator drift. Strength bilateral upper extremity 5/5, bilateral lower extremities 5/5.  Rapid alternating movements intact and symmetric  Sensation: Intact to light touch bilaterally. No neglect or extinction on double simultaneous testing.  Coordination: No dysmetria on finger-to-nose and heel-to-shin bilaterally  Reflexes: Downgoing toes bilaterally   Gait: Narrow gait and steady      MEDICATIONS  (STANDING):  aspirin enteric coated 81 milliGRAM(s) Oral daily  atorvastatin 80 milliGRAM(s) Oral at bedtime  clopidogrel Tablet 75 milliGRAM(s) Oral daily  enoxaparin Injectable 40 milliGRAM(s) SubCutaneous every 24 hours  fluticasone propionate 50 MICROgram(s)/spray Nasal Spray 1 Spray(s) Both Nostrils two times a day    MEDICATIONS  (PRN):  acetaminophen   Tablet .. 650 milliGRAM(s) Oral every 6 hours PRN Mild Pain (1 - 3)      LABS:                         14.6   7.89  )-----------( 303      ( 07 Aug 2019 06:30 )             42.8     08-07    134<L>  |  99  |  11  ----------------------------<  99  4.6   |  25  |  1.01    Ca    8.7      07 Aug 2019 06:30    TPro  7.0  /  Alb  4.4  /  TBili  0.9  /  DBili  x   /  AST  25  /  ALT  27  /  AlkPhos  67  08-06    PT/INR - ( 06 Aug 2019 19:13 )   PT: 11.2 sec;   INR: 0.99          PTT - ( 06 Aug 2019 19:13 )  PTT:29.9 sec  Urinalysis Basic - ( 06 Aug 2019 22:17 )    Color: Yellow / Appearance: Clear / SG: <=1.005 / pH: x  Gluc: x / Ketone: NEGATIVE  / Bili: Negative / Urobili: 0.2 E.U./dL   Blood: x / Protein: NEGATIVE mg/dL / Nitrite: NEGATIVE   Leuk Esterase: NEGATIVE / RBC: x / WBC x   Sq Epi: x / Non Sq Epi: x / Bacteria: x            RADIOLOGY, EKG & ADDITIONAL TESTS: Reviewed.

## 2019-08-07 NOTE — OCCUPATIONAL THERAPY INITIAL EVALUATION ADULT - PERTINENT HX OF CURRENT PROBLEM, REHAB EVAL
R/O TIA vs. New onset seizures. CT head noncontrast negative for evidence of acute infarct or acute hemorrhage. CTA normal, CTP normal.

## 2019-08-07 NOTE — PROGRESS NOTE ADULT - ATTENDING COMMENTS
Patient seen and examined with Resident.  Agree with above.  Patient had episode of speech disturbance - trouble reading and understanding words followed by speaking incorrect words to his girlfriend.  It resolved in about 1-2 hours.  Concern for TIA / CVA so workup including MRI Brain without damaris, PETRA, PT/OT evaluations.  Agree with Aspirin, Plavix and Atorvastatin for secondary stroke prevention  DVT prophylaxis Patient seen and examined with Resident.  Agree with above.  Patient had episode of speech disturbance - trouble reading and understanding words followed by speaking incorrect words to his girlfriend.  It resolved in about 1-2 hours.  Concern for TIA / CVA so workup including MRI Brain without damaris, PETRA, PT/OT evaluations.  Agree with Aspirin, Plavix and Atorvastatin for secondary stroke prevention  DVT prophylaxis    Differential diagnosis includes seizure and migraine variant so workup as follows -   No shaking or urine or bowel incontinence but will perform vEEG to rule out seizures  The event was also preceded by bright light in his right eye.  No vision changes now.  Some left sided headache from the back radiating to the front.  - Will monitor headache for now and treat symptomatically.  Migraine is diagnosis of exclusion.

## 2019-08-07 NOTE — PHYSICAL THERAPY INITIAL EVALUATION ADULT - PERTINENT HX OF CURRENT PROBLEM, REHAB EVAL
Pt is a 55 yo male presenting to [facility] after an episode of a bright light in his right eye and confusion (unable to understand emails) for 1.5 hrs, admitted for workup of TIA.

## 2019-08-07 NOTE — OCCUPATIONAL THERAPY INITIAL EVALUATION ADULT - MD ORDER
Pt is a 54 year old male with PMHx of hyponatremia (worked up as an outpatient), cervical radiculopathy with paresthesias of the L hand, post nasal drip. Pt was at work when he noticed a bright light in his right eye, and had difficulty understanding his emails. He called his girlfriend, who could not understand him, she advised him to go to the ED. Pt walked to the urgent care center "but did not feel like himself". Pt referred to [facility] ED from urgent care center.  Stroke code called upon arrival

## 2019-08-07 NOTE — OCCUPATIONAL THERAPY INITIAL EVALUATION ADULT - GENERAL OBSERVATIONS, REHAB EVAL
Pt is right hand dominant. Pt received supine in bed - +EKG, heplock. NAD. CORINA Ziegler cleared Pt for OT eval. Pt agreeable.

## 2019-08-07 NOTE — OCCUPATIONAL THERAPY INITIAL EVALUATION ADULT - LEVEL OF INDEPENDENCE: GROOMING, OT EVAL
Otc Regimen: CeraVe Moisturizing Lotion Samples Given: Bryhali x2 QAM Detail Level: Simple Plan: Essentially at this point there does not seem to be any evidence of infection. There is some slight scaling and irritation likely related to a possible allergic reaction to the topical antibiotic cream that he has since stopped. Patient is simply going to continue using the mild cleanser and daily lotion and start using the topical steroid daily for up to two weeks. He will call or follow up if this is not improving or if this reoccurs. If he does get new sores in the nose in the future he will follow up for evaluation and possible culture independent

## 2019-08-07 NOTE — CONSULT NOTE ADULT - SUBJECTIVE AND OBJECTIVE BOX
Patient is a 54y old  Male who presents with a chief complaint of Confusion (06 Aug 2019 22:22)       HPI:  Mr. Howard is a 54 year old male with a PMHx of hyponatremia (worked up as an outpatient), cervical radiculopathy with paresthesias of the L hand, post nasal drip (takes zyrtec, benadryl, and inhaler), and tobacco use disorder (~20 pack years, quit 5 years ago, screening CT chest 1 year ago). Today, he was working when at 5:30 pm he noticed a bright light in his right eye, and had difficulty understanding his emails. He called his girlfriend, who could not understand him, and she advised him to go to the ED. He decided to walk to an urgent care center & felt "not quite drunk" while walking there. While waiting at the urgent care, his symptoms resolved (duration ~1.5 hrs). He was referred to the [facility] ED from the urgent care center.    ED Course: VS HR 67, /94, RR 18, SpO2 98% on room air. Stroke code initiated. Received aspirin 81, plavix 75, & atorvastatin 80, CT (negative for acute hemorrhage or perfusion defect), UA negative, BMP w/Na 131 & Cl 95.     Arrived @ the floor & given lovenox 40 sc, albuterol, and fluticasone. (06 Aug 2019 22:22)      PAST MEDICAL & SURGICAL HISTORY:  Hyponatremia  Cervical radiculopathy  Post-nasal drip  S/P knee surgery      MEDICATIONS  (STANDING):  aspirin enteric coated 81 milliGRAM(s) Oral daily  atorvastatin 80 milliGRAM(s) Oral at bedtime  clopidogrel Tablet 75 milliGRAM(s) Oral daily  enoxaparin Injectable 40 milliGRAM(s) SubCutaneous every 24 hours  fluticasone propionate 50 MICROgram(s)/spray Nasal Spray 1 Spray(s) Both Nostrils two times a day    MEDICATIONS  (PRN):  acetaminophen   Tablet .. 650 milliGRAM(s) Oral every 6 hours PRN Mild Pain (1 - 3)      Social History per patient:             -  (20 pack years, quit 5 yrs ago )  tobacco,  (- )   IVDA,  (- )  iliicit drug use,  (2-3 galsses of wine/day)  alcohol           - Occupation:  for non profit           - Home Living Status: lives alone in a [facility] Care Services:  X    Functional Level Prior to Admission per patient:                     - ADL's: fully independent           - ambulates without devices    FAMILY HISTORY:  No pertinent family history in first degree relatives      CBC Full  -  ( 07 Aug 2019 06:30 )  WBC Count : 7.89 K/uL  RBC Count : 4.68 M/uL  Hemoglobin : 14.6 g/dL  Hematocrit : 42.8 %  Platelet Count - Automated : 303 K/uL  Mean Cell Volume : 91.5 fl  Mean Cell Hemoglobin : 31.2 pg  Mean Cell Hemoglobin Concentration : 34.1 gm/dL  Auto Neutrophil # : x  Auto Lymphocyte # : x  Auto Monocyte # : x  Auto Eosinophil # : x  Auto Basophil # : x  Auto Neutrophil % : x  Auto Lymphocyte % : x  Auto Monocyte % : x  Auto Eosinophil % : x  Auto Basophil % : x      08-07    134<L>  |  99  |  11  ----------------------------<  99  4.6   |  25  |  1.01    Ca    8.7      07 Aug 2019 06:30    TPro  7.0  /  Alb  4.4  /  TBili  0.9  /  DBili  x   /  AST  25  /  ALT  27  /  AlkPhos  67  08-06      Urinalysis Basic - ( 06 Aug 2019 22:17 )    Color: Yellow / Appearance: Clear / SG: <=1.005 / pH: x  Gluc: x / Ketone: NEGATIVE  / Bili: Negative / Urobili: 0.2 E.U./dL   Blood: x / Protein: NEGATIVE mg/dL / Nitrite: NEGATIVE   Leuk Esterase: NEGATIVE / RBC: x / WBC x   Sq Epi: x / Non Sq Epi: x / Bacteria: x          Radiology:    < from: CT Brain Stroke Protocol (08.06.19 @ 19:28) >  EXAM:  CT BRAIN STROKE PROTOCOL                          PROCEDURE DATE:  08/06/2019          INTERPRETATION:  PROCEDURE: CT brain without intravenous contrast    INDICATION: Expressive and receptive aphasia as well as right visual   disturbance for an hour and a half, now resolved.     TECHNIQUE: Multiple axial images were obtained at 5 mm intervals from the   skull base to the vertex. Sagittal and coronal reformatted images were   obtained from the axial data set. The images were reviewed in brain and   bone windows.    COMPARISON: None    FINDINGS: The CT examination demonstrates the ventricles, cisternal   spaces, and cortical sulci to be within normal limits. There is no   midline shift or extra axial collections. The gray white differentiation   appears within normal limits. There is no intracranial hemorrhage or   acute transcortical infarct.     The bony windows demonstrates no fractures. There is mild soft tissue   thickening of the maxillary sinuses bilaterally and near complete   opacification of the ethmoid air cells. The mastoid air cells are well   aerated.    IMPRESSION:     No acute intracranial hemorrhage or acute transcortical infarct        < from: CT Perfusion w/ Maps w/ IV Cont (08.06.19 @ 19:30) >  EXAM:  CT PERFUSION W MAPS IC                          PROCEDURE DATE:  08/06/2019          INTERPRETATION:  CT PERFUSION WITH CONTRAST    INDICATION: aphasia.    TECHNIQUE:     After the bolus administration of 40cc of Optiray-350, CT perfusion is  obtained as per [facility] protocol. Perfusion maps are provided.    COMPARISON: none.    FINDINGS:    On CT perfusion, there is no reported defect in CBF or region of elevated   Tmax > 6 seconds, nor mismatch in volume thereof.    IMPRESSION:     No perfusion mismatch identified. Unremarkable CT perfusion study.        < from: CT Angio Head w/ IV Cont (08.06.19 @ 19:28) >  EXAM:  CT ANGIO BRAIN (W)AW IC                          EXAM:  CT ANGIO NECK (W)AW IC                          PROCEDURE DATE:  08/06/2019          INTERPRETATION:  CT ANGIOGRAM OF THE CERVICAL ARTERIES   CTA OF THE [place] OF [place]:    INDICATION:aphasia. stroke code.     TECHNIQUE:     CTA [place]:     After the intravenous power injection of non-ionic contrast material,   serial thin sections were obtained through the intracranial circulation   on a multislice CT scanner.  Images were reformatted using the 3D Vitrea   software package.    CTA NECK:    After the intravenous power injection of non-ionic contrast material,   serial thin sections were obtained through the cervical circulation on a   multislice CT scanner.  Images were reformatted using the 3D Vitrea   software package.    FINDINGS:    CTA [place] OF [place]:    Posterior circulation demonstrates no stenosis in the distal vertebral   arteries, basilar artery, superior cerebellar arteries bilaterally or the   posterior cervical arteries bilaterally. Both PICA origins are intact.    Examination of the anterior circulation demonstrates that the left A1   segment is hypoplastic, normal variant. There is no stenosis in the   anterior cerebral arteries bilaterally or middle cerebral arteries   bilaterally. There is no stenosis identified in the petrous, cavernous,   or supraclinoid ICA bilaterally.      CTA NECK:    Left subclavian artery, left vertebral artery, left common carotid   artery, right innominate artery, right subclavian artery, right common   carotid artery, and right vertebral artery are all patent at their   origins.    There is no vertebral artery stenosis in the its cervical course   bilaterally.     There is no carotid bifurcation stenosis bilaterally.    On the sagittal reformations, there is multilevel degenerative disc   disease and osteoarthritis noted in the cervical spine. Incidental note   is made of right lingual tonsillar calcifications.    IMPRESSION:    CTA COW:    No large vessel occlusion or significant stenosis of the intracranial   [place].    CTA NECK:    1. No carotid bifurcation stenosis.  2. No vertebral artery stenosis.              Vital Signs Last 24 Hrs  T(C): 37.1 (07 Aug 2019 08:21), Max: 37.1 (07 Aug 2019 08:21)  T(F): 98.8 (07 Aug 2019 08:21), Max: 98.8 (07 Aug 2019 08:21)  HR: 74 (07 Aug 2019 08:21) (62 - 82)  BP: 141/65 (07 Aug 2019 08:21) (120/68 - 146/94)  BP(mean): 94 (07 Aug 2019 08:21) (88 - 97)  RR: 16 (07 Aug 2019 08:21) (16 - 18)  SpO2: 97% (07 Aug 2019 08:21) (96% - 98%)    REVIEW OF SYSTEMS:    CONSTITUTIONAL: No fever, weight loss, or fatigue  EYES: No eye pain, visual disturbances, or discharge  ENMT:  No difficulty hearing, tinnitus, vertigo; No sinus or throat pain  NECK: No pain or stiffness  BREASTS: No pain, masses, or nipple discharge  RESPIRATORY: No cough, wheezing, chills or hemoptysis; No shortness of breath  CARDIOVASCULAR: No chest pain, palpitations, dizziness, or leg swelling  GASTROINTESTINAL: No abdominal or epigastric pain. No nausea, vomiting, or hematemesis; No diarrhea or constipation. No melena or hematochezia.  GENITOURINARY: No dysuria, frequency, hematuria, or incontinence  NEUROLOGICAL: No headaches, memory loss, loss of strength, numbness, or tremors  SKIN: No itching, burning, rashes, or lesions   LYMPH NODES: No enlarged glands  ENDOCRINE: No heat or cold intolerance; No hair loss  MUSCULOSKELETAL: No joint pain or swelling; No muscle, back, or extremity pain  PSYCHIATRIC: No depression, anxiety, mood swings, or difficulty sleeping  HEME/LYMPH: No easy bruising, or bleeding gums  ALLERGY AND IMMUNOLOGIC: No hives or eczema  VASCULAR: no swelling, erythema      Physical Exam: WDWN 53 yo  gentleman lying in semi Paz's position, currently w/o complaints    Head: normocephalic, atraumatic    Eyes: PERRLA, EOMI, no nystagmus, sclera anicteric    ENT: nasal discharge, uvula midline, no oropharyngeal erythema/exudate    Neck: supple, negative JVD, negative carotid bruits, no thyromegaly    Chest: CTA bilaterally, neg wheeze/ rhonchi/ rales/ crackles/ egophany    Cardiovascular: regular rate and rhythm, neg murmurs/rubs/gallops    Abdomen: soft, non distended, non tender, negative rebound/guarding, normal bowel sounds, neg hepatosplenomegaly    Extremities: WWP, neg cyanosis/clubbing/edema, negative calf tenderness to palpation, negative Deven's sign    :     Neurologic Exam:    Alert and oriented to person, place, date/year, speech fluent w/o dysarthria, recent and remote memory intact, repetition intact, comprehension intact    Cranial Nerves:     II:                       pupils equal, round and reactive to light, visual fields intact   III/ IV/VI:             extraocular movements intact, neg nystagmus, neg ptosis  V:                       facial sensation intact, V1-3 normal  VII:                     face symmetric, no droop, normal eye closure and smile  VIII:                    hearing intact to finger rub bilaterally  IX/ X:                 soft palate rise symmetrical  XI:                      head turning, shoulder shrug normal  XII:                     tongue midline    Motor Exam:    Upper Extremities:     RIght:     5/5   /intrinsics               5/5  wrist flexors/extensors               5/5  biceps/triceps               5/5  deltoid               negative drift    Left :     5/5  /intrinsics               5/5  wrist flexors/extensors               5/5 biceps/triceps               5/5 deltoid               negative drift    Lower Extremities:                 Right:      5/5  DF/PF/ evertors/ invertors                5/5  Quad/ hamstrings                5/5  hip flexors/adductors/abductors                 Left:       5/5  DF/PF/ evertors/ invertors                5/5  Quad/ hamstrings                5/5  hip flexors/adductors/abductors                       Sensory:    intact to LT/PP in all UE/LE dermatomes    DTR:            = biceps/     triceps/     brachioradialis                      = patella/   medial hamstring/ankle                      neg clonus                      neg Babinski                        Finger to Nose:  wnl    Heel to Shin:  wnl    Rapid Alternating movements:  wnl    Joint Position Sense:  intact    Romberg:  not tested    Tandem Walking:  not tested    Gait:  not tested        PM&R Impression:    1) TIA  2) no focal neuro deficits        Recommendations:    1) Physical therapy focusing on therapeutic exercises, bed mobility/transfer out of bed evaluation, progressive ambulation with assistive devices prn.    2) Anticipated Disposition Plan/Recs: d/c home with no post discharge rehab needs

## 2019-08-07 NOTE — CONSULT NOTE ADULT - ASSESSMENT
54 year old male w/PMHx of tobacco use d/o, hyponatremia, cervical radiculopathy, & post-nasal drip, presenting to [facility] after an episode of a bright light in his right eye and confusion (unable to understand emails) for 1.5 hrs, admitted for workup of TIA.     Problem/Plan - 1:  ·  Problem: TIA (transient ischemic attack).  Plan: Pt presenting with symptoms concerning for TIA as he had symptoms of expressive aphasia, where the words he was speaking were not understood as well as transient visual sensation of white light. Stroke code called. CT Head negative & complete resolution of symptoms.   -S/p Atorvastatin 80, Plavix 75, Aspirin 81 to be continued   -F/u MRI-head non contrast   -f/u TTE with bubble  -f/u video EEG  -f/u SBP goal 160-200  -f/u Dysphagia screen- passed in ED  - C/w neurological avkcc6p.    Problem/Plan - 2:  ·  Problem: Cervical radiculopathy.  Plan: -Left hand paresthesia in ED  -Consistent with previous symptoms.     Problem/Plan - 3:  ·  Problem: Hyponatremia.  Plan: 1 year history of hyponatremia, also present on outpatient physical 3 weeks ago. Worked up as an outpatient, patient unaware of exact diagnosis. Reports prior imaging and recommended fluid restriction. 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   -F/u outpatient provider  -CT chest negative 1 year ago  -F/u AM BMPqd   -VEEG monitor to rule out seizure.    Problem/Plan - 4:  ·  Problem: Post-nasal drip.  Plan: Patient started taking albuterol (1-2 puffs daily), benadryl qhs, zyrtec qd for post-nasal drip & sinus symptoms 3 weeks ago.  -Start albuterol nebulizer PRN  -Start inhaled fluticasone propionate   -Consider restarting benadryl.     Problem/Plan - 5:  ·  Problem: Nutrition, metabolism, and development symptoms.  Plan: Fluids: none  Electrolytes: replete as necessary, K>4, Mg>2  Nutrition: DASH/TLC diet  DVT ppx: loveonx 40 mg sc qd  Code: Full

## 2019-08-07 NOTE — OCCUPATIONAL THERAPY INITIAL EVALUATION ADULT - ADDITIONAL COMMENTS
Pt lives alone in 5th floor walk up apartment. Pt reports independence in ADLs and IADLs. Denies DME usage/possession. Pt works as a  in a non-profit organization.

## 2019-08-07 NOTE — PHYSICAL THERAPY INITIAL EVALUATION ADULT - MODALITIES TREATMENT COMMENTS
--- hand dominant; (R) CN Testing: B/L Frontalis intact; B/L buccinator intact; smile symmetrical; tongue protrusion at midline; B/L eyes open/close intact; Shoulder elevation: intact bilaterally; Vision H-Test: bilateral tracking and smooth pursuit intact; Convergence/Divergence: intact; Vision Quadrant Test: intact bilaterally

## 2019-08-07 NOTE — PHYSICAL THERAPY INITIAL EVALUATION ADULT - FOLLOWS COMMANDS/ANSWERS QUESTIONS, REHAB EVAL
Pt able to do sequential subtraction of 7 from 100 , able to spell the word "World" backward/able to follow multistep instructions/100% of the time/able to follow single-step instructions

## 2019-08-08 ENCOUNTER — TRANSCRIPTION ENCOUNTER (OUTPATIENT)
Age: 54
End: 2019-08-08

## 2019-08-08 ENCOUNTER — INBOUND DOCUMENT (OUTPATIENT)
Age: 54
End: 2019-08-08

## 2019-08-08 VITALS — TEMPERATURE: 98 F

## 2019-08-08 PROBLEM — Z00.00 ENCOUNTER FOR PREVENTIVE HEALTH EXAMINATION: Status: ACTIVE | Noted: 2019-08-08

## 2019-08-08 LAB
ERYTHROCYTE [SEDIMENTATION RATE] IN BLOOD: 3 MM/HR — SIGNIFICANT CHANGE UP
EXTRA GREEN TOP TUBE: SIGNIFICANT CHANGE UP

## 2019-08-08 PROCEDURE — 83036 HEMOGLOBIN GLYCOSYLATED A1C: CPT

## 2019-08-08 PROCEDURE — 94640 AIRWAY INHALATION TREATMENT: CPT

## 2019-08-08 PROCEDURE — 85027 COMPLETE CBC AUTOMATED: CPT

## 2019-08-08 PROCEDURE — 81003 URINALYSIS AUTO W/O SCOPE: CPT

## 2019-08-08 PROCEDURE — 85652 RBC SED RATE AUTOMATED: CPT

## 2019-08-08 PROCEDURE — 82962 GLUCOSE BLOOD TEST: CPT

## 2019-08-08 PROCEDURE — 97161 PT EVAL LOW COMPLEX 20 MIN: CPT

## 2019-08-08 PROCEDURE — 99285 EMERGENCY DEPT VISIT HI MDM: CPT | Mod: 25

## 2019-08-08 PROCEDURE — 93005 ELECTROCARDIOGRAM TRACING: CPT

## 2019-08-08 PROCEDURE — 36415 COLL VENOUS BLD VENIPUNCTURE: CPT

## 2019-08-08 PROCEDURE — 84443 ASSAY THYROID STIM HORMONE: CPT

## 2019-08-08 PROCEDURE — 85730 THROMBOPLASTIN TIME PARTIAL: CPT

## 2019-08-08 PROCEDURE — 85025 COMPLETE CBC W/AUTO DIFF WBC: CPT

## 2019-08-08 PROCEDURE — 80048 BASIC METABOLIC PNL TOTAL CA: CPT

## 2019-08-08 PROCEDURE — 95951: CPT | Mod: 26

## 2019-08-08 PROCEDURE — 80061 LIPID PANEL: CPT

## 2019-08-08 PROCEDURE — C8925: CPT

## 2019-08-08 PROCEDURE — 70496 CT ANGIOGRAPHY HEAD: CPT

## 2019-08-08 PROCEDURE — 70498 CT ANGIOGRAPHY NECK: CPT

## 2019-08-08 PROCEDURE — 87521 HEPATITIS C PROBE&RVRS TRNSC: CPT

## 2019-08-08 PROCEDURE — 70551 MRI BRAIN STEM W/O DYE: CPT

## 2019-08-08 PROCEDURE — 99238 HOSP IP/OBS DSCHRG MGMT 30/<: CPT

## 2019-08-08 PROCEDURE — 0042T: CPT

## 2019-08-08 PROCEDURE — 95951: CPT

## 2019-08-08 PROCEDURE — 85610 PROTHROMBIN TIME: CPT

## 2019-08-08 PROCEDURE — 86140 C-REACTIVE PROTEIN: CPT

## 2019-08-08 PROCEDURE — 86803 HEPATITIS C AB TEST: CPT

## 2019-08-08 PROCEDURE — 93306 TTE W/DOPPLER COMPLETE: CPT

## 2019-08-08 PROCEDURE — 80053 COMPREHEN METABOLIC PANEL: CPT

## 2019-08-08 PROCEDURE — 70450 CT HEAD/BRAIN W/O DYE: CPT

## 2019-08-08 RX ORDER — SUMATRIPTAN SUCCINATE 4 MG/.5ML
50 INJECTION, SOLUTION SUBCUTANEOUS ONCE
Refills: 0 | Status: COMPLETED | OUTPATIENT
Start: 2019-08-08 | End: 2019-08-08

## 2019-08-08 RX ORDER — SUMATRIPTAN SUCCINATE 4 MG/.5ML
1 INJECTION, SOLUTION SUBCUTANEOUS
Qty: 3 | Refills: 0
Start: 2019-08-08

## 2019-08-08 RX ORDER — ATORVASTATIN CALCIUM 80 MG/1
1 TABLET, FILM COATED ORAL
Qty: 30 | Refills: 0
Start: 2019-08-08 | End: 2019-09-06

## 2019-08-08 RX ORDER — ASPIRIN/CALCIUM CARB/MAGNESIUM 324 MG
1 TABLET ORAL
Qty: 30 | Refills: 0
Start: 2019-08-08 | End: 2019-09-06

## 2019-08-08 RX ORDER — CLOPIDOGREL BISULFATE 75 MG/1
1 TABLET, FILM COATED ORAL
Qty: 30 | Refills: 0
Start: 2019-08-08 | End: 2019-09-06

## 2019-08-08 RX ADMIN — ALBUTEROL 1 PUFF(S): 90 AEROSOL, METERED ORAL at 03:28

## 2019-08-08 RX ADMIN — Medication 1 SPRAY(S): at 03:27

## 2019-08-08 RX ADMIN — SUMATRIPTAN SUCCINATE 50 MILLIGRAM(S): 4 INJECTION, SOLUTION SUBCUTANEOUS at 07:00

## 2019-08-08 RX ADMIN — BENZOCAINE AND MENTHOL 1 LOZENGE: 5; 1 LIQUID ORAL at 03:27

## 2019-08-08 RX ADMIN — Medication 1 SPRAY(S): at 06:26

## 2019-08-08 NOTE — DISCHARGE NOTE NURSING/CASE MANAGEMENT/SOCIAL WORK - NSDCFUADDAPPT_GEN_ALL_CORE_FT
You have an appointment with Dr. Gomez scheduled for August 29 at 12:30pm. Her office is located at [address], [address] [place] [address]. Please call [phone] if you need to reschedule.    Please follow up with your PCP, Dr. Swan.    Please follow up with a Cardiologist. If you would like to follow up with a [facility] affiliated cardiologist, please call [phone] to schedule an appointment within the next 2 weeks. The office is located at [address] [place] [address]. Please ask for Ernst Gunn (MD) or Mo Watt (MD).

## 2019-08-08 NOTE — DISCHARGE NOTE PROVIDER - CARE PROVIDER_API CALL
Kyra Gomez)  Neurology; Vascular Neurology  [address], [address] [place] [address]  Phone: [phone]  Fax: [phone]  Follow Up Time:     Ernst Chavira)  Cardiology; Internal Medicine  [address] [place] [address]  Phone: [phone]  Fax: [phone]  Follow Up Time:     Mo Blackburn)  Cardiovascular Disease; Internal Medicine  Cardiology [facility], [address] [place] [address]  Phone: [phone]  Fax: [phone]  Follow Up Time:

## 2019-08-08 NOTE — DISCHARGE NOTE PROVIDER - HOSPITAL COURSE
Patient was admitted to rule out TIA after having trouble comprehending simple emails and communicating with his girlfriend. Symptoms resolved upon presentation to the ED. CT Angio Head, CT Brain, CT Neck, and EEG were done to rule out a TIA and were all negative for acute infarct. Patient had no defecits on serial Neuro exams. Patient was started on aspirin, plavix, and lipitor. Followup with cardiology for Holter monitor.        Patient is a 53 y/o male w/ PMH of smoking (20 pack years, quit 5 years ago) and cervical radiculopathy who p/w brief symptoms of bright light in his right eye followed by not understanding simple sentences in his emails for 1.5 hours. He states that his girlfriend told him he was not making sense on the phone.        CTH showed no acute changes. CTA head/neck showed no acute changes.    MRI head demonstrated no evidence of acute infarction. There were a few Nonspecific scattered punctate foci of T2 and Flair signal hyperintensities within the white matter; findings may be seen in patient with migraine headaches, vasculitis, microvascular disease, demyelinating disease, Lyme disease and viral illness.     There was also marked ethmoid, mild to moderate maxillary and frontal sinus inflammatory     disease.    .     TTE w/ bubble study showed ____. ___ events were noted on telemetry monitoring.     HbA1c was ___ and LDL was ____.    Patient was started on _____.    Patient was evaluated by PT/OT who deemed patient a suitable candidate for _____.    Pt is medically stable for discharge ____ to f/u with vascular neurology within the next 2-3 weeks for further management. Patient was admitted to rule out TIA after having trouble comprehending simple emails and communicating with his girlfriend. Symptoms resolved upon presentation to the ED. CT Angio Head, CT Brain, CT Neck, and EEG were done to rule out a TIA and were all negative for acute infarct. Patient had no defecits on serial Neuro exams. Patient was started on aspirin, plavix, and lipitor. Followup with cardiology for Holter monitor.        Patient is a 55 y/o male w/ PMH of smoking (20 pack years, quit 5 years ago) and cervical radiculopathy who p/w brief symptoms of bright light in his right eye followed by not understanding simple sentences in his emails for 1.5 hours. He states that his girlfriend told him he was not making sense on the phone.        CTH showed no acute changes. CTA head/neck showed no acute changes.    MRI head demonstrated no evidence of acute infarction. There were a few Nonspecific scattered punctate foci of T2 and Flair signal hyperintensities within the white matter; findings may be seen in patient with migraine headaches, vasculitis, microvascular disease, demyelinating disease, Lyme disease and viral illness.     There was also marked ethmoid, mild to moderate maxillary and frontal sinus inflammatory     disease.        TTE w/ bubble study showed mild pulmonary hypertension.     LDL was 143.    Patient was started on aspirin, plavix, and lipitor. Given sumatriptan which provided relief for his headache.    Pt is medically stable for discharge today and will f/u with neurology on August 29 for further management. Patient was admitted to rule out TIA after having trouble comprehending simple emails and communicating with his girlfriend. Symptoms resolved upon presentation to the ED. atient had no deficits on serial Neuro exams. CT Angio Head, CT Brain, CT Neck, and EEG were done to rule out a TIA and were all negative for acute infarct. His story is compatible with TIA, though could still be complicated migraine given presence of bright white in his left eye prior to speech difficulty and pain in back left of his head.  Patient was started on aspirin, plavix, and lipitor for presumed TIA. Follow up with cardiology for Holter monitor placement for 3-4 weeks.        Patient is a 53 y/o male w/ PMH of smoking (20 pack years, quit 5 years ago) and cervical radiculopathy who p/w brief symptoms of bright light in his right eye followed by not understanding simple sentences in his emails for 1.5 hours. He states that his girlfriend told him he was not making sense on the phone.        Inpatient workup -     CTH showed no acute changes. CTA head/neck showed no acute changes.    MRI head demonstrated no evidence of acute infarction. There were a few Nonspecific scattered punctate foci of T2 and Flair signal hyperintensities within the white matter; findings may be seen in patient with migraine headaches, vasculitis, microvascular disease, demyelinating disease, Lyme disease and viral illness.     There was also marked ethmoid, mild to moderate maxillary and frontal sinus inflammatory     disease.    TTE w/ bubble study showed mild pulmonary hypertension.     LDL was 143.    Patient was started on aspirin, plavix, and lipitor. Given sumatriptan which provided relief for his headache.    Pt is medically stable for discharge today and will f/u with neurology on August 29 for further management.

## 2019-08-08 NOTE — PROGRESS NOTE ADULT - PROBLEM SELECTOR PLAN 6
-PCP Contacted on Admission: (Y/N) --> Name & Phone #: Dr. Birgit Alvarado [phone]  -Date of Contact with PCP:  -PCP Contacted at Discharge: (Y/N, N/A)  -Summary of Handoff Given to PCP:   -Post-Discharge Appointment Date and Location:
-PCP Contacted on Admission: (Y/N) --> Name & Phone #: Dr. Birgit Alvarado [phone]  -Date of Contact with PCP:  -PCP Contacted at Discharge: (Y/N, N/A)  -Summary of Handoff Given to PCP:   -Post-Discharge Appointment Date and Location:

## 2019-08-08 NOTE — DISCHARGE NOTE PROVIDER - NSDCCPCAREPLAN_GEN_ALL_CORE_FT
PRINCIPAL DISCHARGE DIAGNOSIS  Diagnosis: TIA (transient ischemic attack)  Assessment and Plan of Treatment: You were admitted for a possible stroke like event and all your imaging was negative for any new changes. Please followup with Dr. Gomez at your scheduled appointment. Please also follow up with your PCP and a Cardiologist to place a Holter monitor.

## 2019-08-08 NOTE — DISCHARGE NOTE NURSING/CASE MANAGEMENT/SOCIAL WORK - NSDCPEPTSTRK_GEN_ALL_CORE
Stroke education booklet/Stroke support groups for patients, families, and friends/Call 911 for stroke/Need for follow up after discharge/Prescribed medications/Risk factors for stroke/Stroke warning signs and symptoms/Signs and symptoms of stroke

## 2019-08-08 NOTE — DISCHARGE NOTE PROVIDER - CARE PROVIDERS DIRECT ADDRESSES
[email or web address]

## 2019-08-08 NOTE — DISCHARGE NOTE NURSING/CASE MANAGEMENT/SOCIAL WORK - NSDCDPATPORTLINK_GEN_ALL_CORE
You can access the [facility] Portal, offered by [facility], by registering with the following website: [email or web address]

## 2019-08-08 NOTE — EEG REPORT - NS EEG TEXT BOX
Continuous EEG 8/7/2019 @ 9:18:01 PM to next morning @ 07:00 am    Background:  continuous, with predominantly alpha and beta frequencies.  Symmetry:  No persistent asymmetries of voltage or frequency.  Posterior Dominant Rhythm:  9 Hz symmetric, well-organized, and well-modulated.  Organization: Rudimentary.  Voltage:  Normal (20+ uV)  Variability: Yes. 						  Reactivity: Yes.  N2 sleep: Symmetric, synchronous spindles and K complexes.  Spontaneous Activity:  No epileptiform discharges.  Periodic/rhythmic activity:  None.  Events:  No electrographic seizures or significant clinical events.  Provocations:  Hyperventilation and Photic stimulation: was not performed.    Daily Summary:    Normal EEG, awake and asleep.    Read by:  Nidia Love MD

## 2019-08-08 NOTE — PROGRESS NOTE ADULT - PROBLEM SELECTOR PLAN 4
Patient started taking albuterol (1-2 puffs daily), benadryl qhs, zyrtec qd for post-nasal drip & sinus symptoms 3 weeks ago.  -Start albuterol nebulizer PRN  -Start inhaled fluticasone propionate   -Consider restarting benadryl
Patient started taking albuterol (1-2 puffs daily), benadryl qhs, zyrtec qd for post-nasal drip & sinus symptoms 3 weeks ago.  -Start albuterol nebulizer PRN  -Start inhaled fluticasone propionate   -Consider restarting benadryl

## 2019-08-08 NOTE — DISCHARGE NOTE PROVIDER - PROVIDER TOKENS
PROVIDER:[TOKEN:[[id number]]],PROVIDER:[TOKEN:[[id number]]],PROVIDER:[TOKEN:[[id number]]]

## 2019-08-08 NOTE — PROGRESS NOTE ADULT - PROBLEM SELECTOR PLAN 3
1 year history of hyponatremia, also present on outpatient physical 3 weeks ago. Worked up as an outpatient, patient unaware of exact diagnosis. Reports prior imaging and recommended fluid restriction. 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   -F/u outpatient provider  -CT chest negative 1 year ago  -Latest sodium is improved to 134  -F/u AM BMPqd   -VEEG monitor to rule out seizure
1 year history of hyponatremia, also present on outpatient physical 3 weeks ago. Worked up as an outpatient, patient unaware of exact diagnosis. Reports prior imaging and recommended fluid restriction. 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   -F/u outpatient provider  -CT chest negative 1 year ago  -Latest sodium is improved to 134  -F/u AM BMPqd   -VEEG monitor to rule out seizure

## 2019-08-08 NOTE — PROGRESS NOTE ADULT - PROBLEM SELECTOR PLAN 1
Pt presenting with symptoms concerning for TIA as he had symptoms of expressive aphasia, where the words he was speaking were not understood as well as transient visual sensation of white light. Stroke code called. CT Head negative & complete resolution of symptoms.   -Continue Atorvastatin 80, Plavix 75, Aspirin 81   -F/u MRI-head non contrast   -f/u TTE with bubble  -f/u PETRA  -f/u video EEG  -f/u SBP goal 160-200  -f/u PT and OT eval  -f/u Dysphagia screen- passed in ED  -repeat EKG  - C/w neurological [id number]

## 2019-08-08 NOTE — DISCHARGE NOTE PROVIDER - NSDCFUADDAPPT_GEN_ALL_CORE_FT
You have an appointment with Dr. Gomez scheduled for August 29 at 12:30pm. Her office is located at [address], [address] [place] [address]. Please call [phone] if you need to reschedule.    Please follow up with your PCP, Dr. Swan.    Please follow up with a Cardiologist. If you would like to follow up with a [facility] affiliated cardiologist, please call [phone] to schedule an appointment within the next 2 weeks. The office is located at [address] [place] [address]. Please ask for Ernst Gunn (MD) or Mo Watt (MD).

## 2019-08-08 NOTE — PROGRESS NOTE ADULT - PROBLEM SELECTOR PLAN 2
-Left hand paresthesia in ED  -Consistent with previous symptoms
-Left hand paresthesia in ED  -Consistent with previous symptoms

## 2019-08-08 NOTE — PROGRESS NOTE ADULT - PROBLEM SELECTOR PLAN 5
Fluids: none  Electrolytes: replete as necessary, K>4, Mg>2  Nutrition: DASH/TLC diet  DVT ppx: loveonx 40 mg sc qd  Code: Full  Disposition: 7 Lach
Fluids: none  Electrolytes: replete as necessary, K>4, Mg>2  Nutrition: DASH/TLC diet  DVT ppx: loveonx 40 mg sc qd  Code: Full  Disposition: 7 Lach

## 2019-08-08 NOTE — PROGRESS NOTE ADULT - SUBJECTIVE AND OBJECTIVE BOX
Subjective: Patient seen and examined bedside. Patient states that he still has a headache, but has not other complaints.    REVIEW OF SYSTEMS:    CONSTITUTIONAL: Patient complains of headache; denies weakness, fevers or chills  EYES/ENT: Patient admits to throat pain from PETRA yesterday; denies visual changes; denies vertigo  NECK: Patient denies pain or stiffness  RESPIRATORY: Patient denies cough, wheezing, hemoptysis; denies shortness of breath  CARDIOVASCULAR: Patient denies chest pain or palpitations  GASTROINTESTINAL: Patient denies abdominal or epigastric pain, nausea, vomiting, or hematemesis, diarrhea or constipation, melena or hematochezia.  GENITOURINARY: Patient denies dysuria, frequency or hematuria  NEUROLOGICAL: Patient denies numbness or weakness  SKIN: Patient denies itching, burning, rashes, or lesions   All other review of systems is negative unless indicated above.    Vital Signs Last 24 Hrs  T(C): 36.7 (07 Aug 2019 10:00), Max: 37.1 (07 Aug 2019 08:21)  T(F): 98 (07 Aug 2019 10:00), Max: 98.8 (07 Aug 2019 08:21)  HR: 65 (07 Aug 2019 16:05) (62 - 82)  BP: 124/75 (07 Aug 2019 16:05) (117/83 - 146/94)  BP(mean): 92 (07 Aug 2019 16:05) (88 - 97)  RR: 16 (07 Aug 2019 16:05) (16 - 18)  SpO2: 97% (07 Aug 2019 12:18) (96% - 98%)      Physical exam:  General: No acute distress, awake and alert  Cardiovascular: Regular RRR, no M/R/G. No bruits  Pulmonary: Anterior breath sounds clear bilaterally, no crackles or wheezing. No use of accessory muscles  Extremities: Radial and DP pulses +2, no edema    Neurologic:  -Mental status: Awake, alert, oriented to person, place, and time. Speech is fluent with intact naming, repetition, and comprehension, no dysarthria. Recent and remote memory intact. Follows commands. Attention/concentration intact. Fund of knowledge appropriate.  -Cranial nerves:   II: Visual fields are full to confrontation.  III, IV, VI: Extraocular movements are intact without nystagmus. Pupils equally round and reactive to light  V:  Facial sensation V1-V3 equal and intact   VII: Face is symmetric with normal eye closure and smile  VIII: Hearing is bilaterally intact to finger rub  IX, X: Uvula is midline and soft palate rises symmetrically  XI: Head turning and shoulder shrug are intact.  XII: Tongue protrudes midline  Motor: Normal bulk and tone. No pronator drift. Strength bilateral upper extremity 5/5, bilateral lower extremities 5/5.  Rapid alternating movements intact and symmetric  Sensation: Intact to light touch bilaterally. No neglect or extinction on double simultaneous testing.  Coordination: No dysmetria on finger-to-nose and heel-to-shin bilaterally  Reflexes: Downgoing toes bilaterally   Gait: Narrow gait and steady      MEDICATIONS  (STANDING):  aspirin enteric coated 81 milliGRAM(s) Oral daily  atorvastatin 80 milliGRAM(s) Oral at bedtime  clopidogrel Tablet 75 milliGRAM(s) Oral daily  enoxaparin Injectable 40 milliGRAM(s) SubCutaneous every 24 hours  fluticasone propionate 50 MICROgram(s)/spray Nasal Spray 1 Spray(s) Both Nostrils two times a day    MEDICATIONS  (PRN):  acetaminophen   Tablet .. 650 milliGRAM(s) Oral every 6 hours PRN Mild Pain (1 - 3)      LABS:                         14.6   7.89  )-----------( 303      ( 07 Aug 2019 06:30 )             42.8     08-07    134<L>  |  99  |  11  ----------------------------<  99  4.6   |  25  |  1.01    Ca    8.7      07 Aug 2019 06:30    TPro  7.0  /  Alb  4.4  /  TBili  0.9  /  DBili  x   /  AST  25  /  ALT  27  /  AlkPhos  67  08-06    PT/INR - ( 06 Aug 2019 19:13 )   PT: 11.2 sec;   INR: 0.99          PTT - ( 06 Aug 2019 19:13 )  PTT:29.9 sec  Urinalysis Basic - ( 06 Aug 2019 22:17 )    Color: Yellow / Appearance: Clear / SG: <=1.005 / pH: x  Gluc: x / Ketone: NEGATIVE  / Bili: Negative / Urobili: 0.2 E.U./dL   Blood: x / Protein: NEGATIVE mg/dL / Nitrite: NEGATIVE   Leuk Esterase: NEGATIVE / RBC: x / WBC x   Sq Epi: x / Non Sq Epi: x / Bacteria: x            RADIOLOGY, EKG & ADDITIONAL TESTS: Reviewed. Subjective: Patient seen and examined bedside. Patient states that he still has a headache, but has not other complaints.    REVIEW OF SYSTEMS:    CONSTITUTIONAL: Patient complains of headache; denies weakness, fevers or chills  EYES/ENT: Patient admits to throat pain from PETRA yesterday; denies visual changes; denies vertigo  NECK: Patient denies pain or stiffness  RESPIRATORY: Patient denies cough, wheezing, hemoptysis; denies shortness of breath  CARDIOVASCULAR: Patient denies chest pain or palpitations  GASTROINTESTINAL: Patient denies abdominal or epigastric pain, nausea, vomiting, or hematemesis, diarrhea or constipation, melena or hematochezia.  GENITOURINARY: Patient denies dysuria, frequency or hematuria  NEUROLOGICAL: Patient denies numbness or weakness  SKIN: Patient denies itching, burning, rashes, or lesions   All other review of systems is negative unless indicated above.    Vital Signs Last 24 Hrs  T(C): 36.9 (08 Aug 2019 05:30), Max: 37.1 (07 Aug 2019 08:21)  T(F): 98.4 (08 Aug 2019 05:30), Max: 98.8 (07 Aug 2019 08:21)  HR: 80 (08 Aug 2019 03:37) (62 - 80)  BP: 126/84 (08 Aug 2019 03:37) (117/83 - 141/65)  BP(mean): 94 (08 Aug 2019 00:20) (92 - 97)  RR: 20 (08 Aug 2019 03:37) (16 - 20)  SpO2: 96% (08 Aug 2019 03:37) (95% - 97%)    Physical exam:  General: No acute distress, awake and alert  Cardiovascular: Regular RRR, no M/R/G. No bruits  Pulmonary: Anterior breath sounds clear bilaterally, no crackles or wheezing. No use of accessory muscles  Extremities: Radial and DP pulses +2, no edema    Neurologic:  -Mental status: Awake, alert, oriented to person, place, and time. Speech is fluent with intact naming, repetition, and comprehension, no dysarthria. Recent and remote memory intact. Follows commands. Attention/concentration intact. Fund of knowledge appropriate.  -Cranial nerves:   II: Visual fields are full to confrontation.  III, IV, VI: Extraocular movements are intact without nystagmus. Pupils equally round and reactive to light  V:  Facial sensation V1-V3 equal and intact   VII: Face is symmetric with normal eye closure and smile  VIII: Hearing is bilaterally intact to finger rub  IX, X: Uvula is midline and soft palate rises symmetrically  XI: Head turning and shoulder shrug are intact.  XII: Tongue protrudes midline  Motor: Normal bulk and tone. No pronator drift. Strength bilateral upper extremity 5/5, bilateral lower extremities 5/5.  Rapid alternating movements intact and symmetric  Sensation: Intact to light touch bilaterally. No neglect or extinction on double simultaneous testing.  Coordination: No dysmetria on finger-to-nose and heel-to-shin bilaterally  Reflexes: Downgoing toes bilaterally   Gait: Narrow gait and steady      MEDICATIONS  (STANDING):  aspirin enteric coated 81 milliGRAM(s) Oral daily  atorvastatin 80 milliGRAM(s) Oral at bedtime  clopidogrel Tablet 75 milliGRAM(s) Oral daily  enoxaparin Injectable 40 milliGRAM(s) SubCutaneous every 24 hours  fluticasone propionate 50 MICROgram(s)/spray Nasal Spray 1 Spray(s) Both Nostrils two times a day    MEDICATIONS  (PRN):  acetaminophen   Tablet .. 650 milliGRAM(s) Oral every 6 hours PRN Mild Pain (1 - 3)  ALBUTerol    90 MICROgram(s) HFA Inhaler 1 Puff(s) Inhalation every 8 hours PRN Shortness of Breath and/or Wheezing  benzocaine 15 mG/menthol 3.6 mG Lozenge 1 Lozenge Oral every 2 hours PRN Sore Throat  sodium chloride 0.65% Nasal 1 Spray(s) Both Nostrils every 4 hours PRN Nasal Congestion        LABS:                         14.6   7.89  )-----------( 303      ( 07 Aug 2019 06:30 )             42.8     08-07    134<L>  |  99  |  11  ----------------------------<  99  4.6   |  25  |  1.01    Ca    8.7      07 Aug 2019 06:30    TPro  7.0  /  Alb  4.4  /  TBili  0.9  /  DBili  x   /  AST  25  /  ALT  27  /  AlkPhos  67  08-06    PT/INR - ( 06 Aug 2019 19:13 )   PT: 11.2 sec;   INR: 0.99          PTT - ( 06 Aug 2019 19:13 )  PTT:29.9 sec  Urinalysis Basic - ( 06 Aug 2019 22:17 )    Color: Yellow / Appearance: Clear / SG: <=1.005 / pH: x  Gluc: x / Ketone: NEGATIVE  / Bili: Negative / Urobili: 0.2 E.U./dL   Blood: x / Protein: NEGATIVE mg/dL / Nitrite: NEGATIVE   Leuk Esterase: NEGATIVE / RBC: x / WBC x   Sq Epi: x / Non Sq Epi: x / Bacteria: x            RADIOLOGY, EKG & ADDITIONAL TESTS: Reviewed.

## 2019-08-08 NOTE — PROGRESS NOTE ADULT - ASSESSMENT
54 year old male w/PMHx of tobacco use d/o, hyponatremia, cervical radiculopathy, & post-nasal drip, presenting to [facility] after an episode of a bright light in his right eye and confusion (unable to understand emails) for 1.5 hrs, admitted for workup of TIA.
54 year old male w/PMHx of tobacco use d/o, hyponatremia, cervical radiculopathy, & post-nasal drip, presenting to [facility] after an episode of a bright light in his right eye and confusion (unable to understand emails) for 1.5 hrs, admitted for workup of TIA.     Problem/Plan - 1:  ·  Problem: TIA (transient ischemic attack).  Plan: Pt presenting with symptoms concerning for TIA as he had symptoms of expressive aphasia, where the words he was speaking were not understood as well as transient visual sensation of white light. Stroke code called. CT Head negative & complete resolution of symptoms.   -Continue Atorvastatin 80, Plavix 75, Aspirin 81   -MRI-head non contrast : neg for acute infarct  -f/u TTE with bubble  -f/u PETRA  -f/u video EEG  -f/u SBP goal 160-200  -f/u Dysphagia screen- passed in ED  -repeat EKG  - C/w neurological [id number].     Problem/Plan - 2:  ·  Problem: Cervical radiculopathy.  Plan: -Left hand paresthesia in ED  -Consistent with previous symptoms.     Problem/Plan - 3:  ·  Problem: Hyponatremia.  Plan: 1 year history of hyponatremia, also present on outpatient physical 3 weeks ago. Worked up as an outpatient, patient unaware of exact diagnosis. Reports prior imaging and recommended fluid restriction. 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   -F/u outpatient provider  -CT chest negative 1 year ago  -Latest sodium is improved to 134  -F/u AM BMPqd   -VEEG monitor to rule out seizure.     Problem/Plan - 4:  ·  Problem: Post-nasal drip.  Plan: Patient started taking albuterol (1-2 puffs daily), benadryl qhs, zyrtec qd for post-nasal drip & sinus symptoms 3 weeks ago.  -Start albuterol nebulizer PRN  -Start inhaled fluticasone propionate   -Consider restarting benadryl.     Problem/Plan - 5:  ·  Problem: Nutrition, metabolism, and development symptoms.  Plan: Fluids: none  Electrolytes: replete as necessary, K>4, Mg>2  Nutrition: DASH/TLC diet  DVT ppx: loveonx 40 mg sc qd  Code: Full  Disposition: 7 Lach.
54 year old male w/PMHx of tobacco use d/o, hyponatremia, cervical radiculopathy, & post-nasal drip, presenting to [facility] after an episode of a bright light in his right eye and confusion (unable to understand emails) for 1.5 hrs, admitted for workup of TIA.

## 2019-08-12 ENCOUNTER — INBOUND DOCUMENT (OUTPATIENT)
Age: 54
End: 2019-08-12

## 2019-08-12 PROBLEM — R09.82 POSTNASAL DRIP: Chronic | Status: ACTIVE | Noted: 2019-08-06

## 2019-08-12 PROBLEM — E87.1 HYPO-OSMOLALITY AND HYPONATREMIA: Chronic | Status: ACTIVE | Noted: 2019-08-06

## 2019-08-12 PROBLEM — M54.12 RADICULOPATHY, CERVICAL REGION: Chronic | Status: ACTIVE | Noted: 2019-08-06

## 2019-08-13 DIAGNOSIS — Z87.891 PERSONAL HISTORY OF NICOTINE DEPENDENCE: ICD-10-CM

## 2019-08-13 DIAGNOSIS — E22.2 SYNDROME OF INAPPROPRIATE SECRETION OF ANTIDIURETIC HORMONE: ICD-10-CM

## 2019-08-13 DIAGNOSIS — R09.82 POSTNASAL DRIP: ICD-10-CM

## 2019-08-13 DIAGNOSIS — I27.20 PULMONARY HYPERTENSION, UNSPECIFIED: ICD-10-CM

## 2019-08-13 DIAGNOSIS — G43.909 MIGRAINE, UNSPECIFIED, NOT INTRACTABLE, WITHOUT STATUS MIGRAINOSUS: ICD-10-CM

## 2019-08-13 DIAGNOSIS — G45.9 TRANSIENT CEREBRAL ISCHEMIC ATTACK, UNSPECIFIED: ICD-10-CM

## 2019-08-13 DIAGNOSIS — M54.12 RADICULOPATHY, CERVICAL REGION: ICD-10-CM

## 2019-08-14 ENCOUNTER — APPOINTMENT (OUTPATIENT)
Dept: HEART AND VASCULAR | Facility: CLINIC | Age: 54
End: 2019-08-14
Payer: COMMERCIAL

## 2019-08-14 VITALS
WEIGHT: 193.98 LBS | DIASTOLIC BLOOD PRESSURE: 70 MMHG | SYSTOLIC BLOOD PRESSURE: 108 MMHG | HEIGHT: 74 IN | OXYGEN SATURATION: 98 % | HEART RATE: 66 BPM | TEMPERATURE: 98.1 F | BODY MASS INDEX: 24.9 KG/M2

## 2019-08-14 DIAGNOSIS — Z82.3 FAMILY HISTORY OF STROKE: ICD-10-CM

## 2019-08-14 DIAGNOSIS — Z80.9 FAMILY HISTORY OF MALIGNANT NEOPLASM, UNSPECIFIED: ICD-10-CM

## 2019-08-14 PROCEDURE — 93000 ELECTROCARDIOGRAM COMPLETE: CPT

## 2019-08-14 PROCEDURE — 99204 OFFICE O/P NEW MOD 45 MIN: CPT

## 2019-08-14 RX ORDER — TADALAFIL 5 MG/1
5 TABLET, FILM COATED ORAL
Refills: 0 | Status: ACTIVE | COMMUNITY

## 2019-08-14 NOTE — PHYSICAL EXAM
[General Appearance - Well Developed] : well developed [Normal Appearance] : normal appearance [Well Groomed] : well groomed [General Appearance - Well Nourished] : well nourished [No Deformities] : no deformities [Normal Conjunctiva] : the conjunctiva exhibited no abnormalities [General Appearance - In No Acute Distress] : no acute distress [Eyelids - No Xanthelasma] : the eyelids demonstrated no xanthelasmas [Normal Oral Mucosa] : normal oral mucosa [No Oral Pallor] : no oral pallor [No Oral Cyanosis] : no oral cyanosis [Normal Jugular Venous A Waves Present] : normal jugular venous A waves present [Normal Jugular Venous V Waves Present] : normal jugular venous V waves present [Heart Rate And Rhythm] : heart rate and rhythm were normal [No Jugular Venous Hearn A Waves] : no jugular venous hearn A waves [Heart Sounds] : normal S1 and S2 [Murmurs] : no murmurs present [Exaggerated Use Of Accessory Muscles For Inspiration] : no accessory muscle use [Respiration, Rhythm And Depth] : normal respiratory rhythm and effort [Auscultation Breath Sounds / Voice Sounds] : lungs were clear to auscultation bilaterally [Abdomen Soft] : soft [Abdomen Tenderness] : non-tender [Abdomen Mass (___ Cm)] : no abdominal mass palpated [Abnormal Walk] : normal gait [Gait - Sufficient For Exercise Testing] : the gait was sufficient for exercise testing [Nail Clubbing] : no clubbing of the fingernails [Cyanosis, Localized] : no localized cyanosis [Petechial Hemorrhages (___cm)] : no petechial hemorrhages [No Venous Stasis] : no venous stasis [Skin Color & Pigmentation] : normal skin color and pigmentation [] : no rash [Skin Lesions] : no skin lesions [No Skin Ulcers] : no skin ulcer [No Xanthoma] : no  xanthoma was observed [Oriented To Time, Place, And Person] : oriented to person, place, and time [Affect] : the affect was normal [No Anxiety] : not feeling anxious [Mood] : the mood was normal

## 2019-08-15 NOTE — HISTORY OF PRESENT ILLNESS
[FreeTextEntry1] : 54 M referred by Dr. Gomez for TIA manifested as a visual disturbance could not process what he was reading GF told him to go to ED treated for TIA otherwise feels well has no cp no sob no palpitations \par \par normal PETRA no shunt \par ecg nsr

## 2019-08-29 ENCOUNTER — APPOINTMENT (OUTPATIENT)
Dept: NEUROLOGY | Facility: CLINIC | Age: 54
End: 2019-08-29
Payer: COMMERCIAL

## 2019-08-29 VITALS
DIASTOLIC BLOOD PRESSURE: 74 MMHG | WEIGHT: 193 LBS | OXYGEN SATURATION: 98 % | SYSTOLIC BLOOD PRESSURE: 115 MMHG | HEART RATE: 79 BPM | BODY MASS INDEX: 24.77 KG/M2 | HEIGHT: 74 IN

## 2019-08-29 DIAGNOSIS — Z72.89 OTHER PROBLEMS RELATED TO LIFESTYLE: ICD-10-CM

## 2019-08-29 DIAGNOSIS — Z80.8 FAMILY HISTORY OF MALIGNANT NEOPLASM OF OTHER ORGANS OR SYSTEMS: ICD-10-CM

## 2019-08-29 DIAGNOSIS — Z80.41 FAMILY HISTORY OF MALIGNANT NEOPLASM OF OVARY: ICD-10-CM

## 2019-08-29 DIAGNOSIS — Z63.4 DISAPPEARANCE AND DEATH OF FAMILY MEMBER: ICD-10-CM

## 2019-08-29 DIAGNOSIS — Z80.1 FAMILY HISTORY OF MALIGNANT NEOPLASM OF TRACHEA, BRONCHUS AND LUNG: ICD-10-CM

## 2019-08-29 PROCEDURE — 99214 OFFICE O/P EST MOD 30 MIN: CPT

## 2019-08-29 SDOH — SOCIAL STABILITY - SOCIAL INSECURITY: DISSAPEARANCE AND DEATH OF FAMILY MEMBER: Z63.4

## 2019-08-30 ENCOUNTER — NON-APPOINTMENT (OUTPATIENT)
Age: 54
End: 2019-08-30

## 2019-08-30 ENCOUNTER — OTHER (OUTPATIENT)
Age: 54
End: 2019-08-30

## 2019-08-30 PROBLEM — Z80.1 FAMILY HISTORY OF LUNG CANCER: Status: ACTIVE | Noted: 2019-08-29

## 2019-08-30 PROBLEM — Z80.8 FAMILY HISTORY OF MALIGNANT NEOPLASM OF THYROID: Status: ACTIVE | Noted: 2019-08-29

## 2019-08-30 PROBLEM — Z80.8 FAMILY HISTORY OF MALIGNANT NEOPLASM OF SKIN: Status: ACTIVE | Noted: 2019-08-29

## 2019-08-30 PROBLEM — Z63.4 WIDOWED: Status: ACTIVE | Noted: 2019-08-29

## 2019-08-30 PROBLEM — Z72.89 CONSUMES ALCOHOL: Status: ACTIVE | Noted: 2019-08-29

## 2019-08-30 PROBLEM — Z80.41 FAMILY HISTORY OF MALIGNANT NEOPLASM OF OVARY: Status: ACTIVE | Noted: 2019-08-29

## 2019-08-30 NOTE — PHYSICAL EXAM
[Sclera] : the sclera and conjunctiva were normal [Neck Appearance] : the appearance of the neck was normal [Neck Cervical Mass (___cm)] : no neck mass was observed [] : no respiratory distress [Respiration, Rhythm And Depth] : normal respiratory rhythm and effort [Auscultation Breath Sounds / Voice Sounds] : lungs were clear to auscultation bilaterally [Heart Rate And Rhythm] : heart rate was normal and rhythm regular [Arterial Pulses Carotid] : carotid pulses were normal with no bruits [Heart Sounds] : normal S1 and S2 [No CVA Tenderness] : no ~M costovertebral angle tenderness [No Spinal Tenderness] : no spinal tenderness [Skin Color & Pigmentation] : normal skin color and pigmentation [Skin Turgor] : normal skin turgor [FreeTextEntry1] : Constitutional: alert, in no acute distress, well nourished and well developed.\par Psychiatric:  insight and judgment were intact.\par \par Neurologic: \par Mental Status: The patient is alert, attentive, and oriented to person, place, and time. Speech is clear and fluent with good repetition, comprehension, and naming.  \par Cranial nerves:\par CN II: Visual fields are full to confrontation. Visual acuity is intact. \par CN III, IV, VI: EOMI, PERRLA\par CN V: Facial sensation is intact to pinprick in all 3 divisions bilaterally. Corneal responses are intact.\par CN VII: Face is symmetric with normal eye closure and smile.\par CN VIII: Hearing is normal to rubbing fingers\par CN IX, X: Palate elevates symmetrically. Phonation is normal.\par CN XI: Head turning and shoulder shrug are intact and symmetric.\par CN XII: Tongue is midline with normal movements and no atrophy and no deviation with protrusion.\par Motor: There is no pronator drift of out-stretched arms. Muscle bulk and tone is normal. Strength is full bilaterally 5/5 on both UE and both LE. \par Sensation: light touch is intact; vibration b/l intact.\par Reflexes:Reflexes are 2+ and symmetric at the biceps, triceps, knees, and ankles.\par Coordination: Rapid alternating movements and fine finger movements are intact. There is no dysmetria on finger-to-nose and heel-knee-shin. There are no abnormal or extraneous movements. Negative Romberg. \par Gait/Stance: Posture is normal. Gait is steady with normal steps, base, arm swing, and turning. Heel and toe walking are normal. \par \par \par \par

## 2019-08-30 NOTE — PROCEDURE
[FreeTextEntry1] : 1. Snoring\par Do you snore loudly (louder than talking or loud enough to be heard through closed doors? yes- sinuses\par \par 2. Tired\par Do you often feel tired, fatigued, or sleepy during daytime? no\par \par 3. Observed\par Has anyone observed you stop breathing during your sleep? no\par \par 4. Blood Pressure\par Do you have or are you being treated for high blood pressure? no\par \par 5. BMI\par Is your BMI more than 35 kg/m2? no\par \par 6. Age\par Is your age over 50 years old? yes\par \par 7. Neck Circumference\par Is your neck circumference greater than 40cm? no, 40cm\par \par 8. Gender\par Is your gender male? yes\par \par \par Low Risk of Sleep Apnea <3\par

## 2019-08-30 NOTE — HISTORY OF PRESENT ILLNESS
[FreeTextEntry1] : 55 y/o right handed M patient w/pmh of smoking (20 pack years- quit 6 years ago- started at the age of 15 and quit in his 30s; used to smoke a pack a day) and cervical radiculopathy presents for post hospital f/u for TIA on 8/6/2019. \par \par Patient said that day he had experienced brief symptoms of bright light in his right eye; he could not understand simple sentences in his emails for 1.5 hours. He was working at home; said he had a stressful work phone call. He laid down for 5-10 minutes and when she spoke with his girlfriend on the phone he was not making any sense. Instead of going to the ER, he went to a [facility]. He states the confusion had resolved but the physician at the urgent care said he was still off and suspected a TIA. \par \par Family history of stroke\par -Paternal uncle- had a stroke in age 80s. Denies any MIs in the family. \par \par History of Migraines\par -Stopped at puberty- however, states they were debilitating. He reports he does not have any. His mother has migraines.\par Denies any numbness, tingling, or weakness.\par Diet: Generally good, includes lots of greens, does not consume a lot of carbs; eats red meat and fish.\par Exercise: avid cyclist and walks\par \par Stroke Prevention:\par -Patient wore LEOBARDO patch- just sent it in. Awaiting results. \par -Reports daily adherence to aspirin 81 and Plavix 75mg; denies any blood in urine or stool. Has some bruising under right arm.\par -Reports daily adherence to atorvastatin 80mg; denies any muscle cramps or pain. \par \par He reports some sinus issues- follows up with ENT?\par

## 2019-08-30 NOTE — DATA REVIEWED
[de-identified] : EXAM:  MR BRAIN                      \par \par PROCEDURE DATE:  08/07/2019  \par \par \par \par INTERPRETATION:  Rule out CVA.\par \par Technique: MRI of the brain was performed utilizing sagittal and axial \par T1, axial T2, axial gradient echo, axial  FLAIR and diffusion imaging.\par \par There are no prior studies available for comparison.\par \par Findings: There is motion artifact that limits this evaluation. There are \par a few scattered punctate foci of T2 and Flair signal hyperintensities \par within the white matter that are nonspecific. There is no evidence of \par mass-effect or midline shift. There is no evidence of intra or \par extra-axial fluid collection. The ventricles are of normal size and \par caliber. There is no evidence of acute infarction. Evaluation of the \par intracranial vascular flow-voids appear within normal limits.\par \par There is minimal bilateral frontal and mild maxillary mucosal thickening. \par There is a small air-fluid level seen in the left frontal sinus. There is \par near complete opacification of the bilateral ethmoid air cells. \par Visualized paranasal sinuses and bilateral mastoid air cells are clear.\par \par Impression: Nonspecific few scattered punctate foci of T2 and Flair \par signal hyperintensities within the white matter; findings may be seen in \par patient with migraine headaches, vasculitis, microvascular disease, \par demyelinating disease, Lyme disease and viral illness.  No evidence of \par acute infarction.\par \par \par \par \par Marked ethmoid, mild to moderate maxillary and frontal sinus inflammatory \par disease.\par \par \par  [de-identified] : Labs\par 8/2019\par total chol 221\par TG 60\par HDL 66\par \par \par A1c 5.2

## 2019-08-30 NOTE — ASSESSMENT
[FreeTextEntry1] : 55 y/o right handed M patient w/pmh of smoking (20 pack years- quit 6 years ago- started at the age of 15 and quit in his 30s; used to smoke a pack a day) and cervical radiculopathy presents for post hospital f/u for TIA on 8/6/2019. Neurological examination is stable and intact.\par \par Plan for TIA\par 1) Secondary stroke prevention - \par a) Continue dual antiplatelet, Aspirin 81mg and Plavix 75mg; reassess in 90 days; accumetrics.\par b) Continue Atorvastatin 80mg; repeat lipid profile in a few months. LDL goal needs to be <70\par c) No DM/PreDM\par d) Former smoker\par e) F/u with cardiology post holter monitor for any arrhythmias, etc. \par \par Plan for Stroke/TIA Factors\par -STOP Score/Sleep study:  Patient scored low risk for sleep apnea; at this moment, home sleep study is not applicable. Discussed the risks of untreated sleep apnea and one of the likelihood cause of strokes. \par -Exercise Rx ([place]): patient prescribed an 8 week regimen of exercises to improve strength and symptoms from the stroke. Stress reliever. \par -Carotid Doppler ultrasound: discussed with patient the importance and need for ultrasound; if normal, repeat in a year. If abnormal, will notify patient. Repeat in 6 months. \par \par \par

## 2019-09-04 ENCOUNTER — TRANSCRIPTION ENCOUNTER (OUTPATIENT)
Age: 54
End: 2019-09-04

## 2019-09-04 ENCOUNTER — APPOINTMENT (OUTPATIENT)
Dept: SLEEP CENTER | Facility: HOME HEALTH | Age: 54
End: 2019-09-04
Payer: COMMERCIAL

## 2019-09-04 DIAGNOSIS — G47.33 OBSTRUCTIVE SLEEP APNEA (ADULT) (PEDIATRIC): ICD-10-CM

## 2019-09-04 PROCEDURE — 95800 SLP STDY UNATTENDED: CPT | Mod: 26

## 2019-09-05 ENCOUNTER — OUTPATIENT (OUTPATIENT)
Dept: OUTPATIENT SERVICES | Facility: HOSPITAL | Age: 54
LOS: 1 days | End: 2019-09-05
Payer: COMMERCIAL

## 2019-09-05 DIAGNOSIS — G47.33 OBSTRUCTIVE SLEEP APNEA (ADULT) (PEDIATRIC): ICD-10-CM

## 2019-09-05 DIAGNOSIS — Z98.890 OTHER SPECIFIED POSTPROCEDURAL STATES: Chronic | ICD-10-CM

## 2019-09-05 PROCEDURE — 95800 SLP STDY UNATTENDED: CPT

## 2019-09-06 PROBLEM — G47.33 OBSTRUCTIVE SLEEP APNEA: Status: ACTIVE | Noted: 2019-09-06

## 2019-09-11 ENCOUNTER — APPOINTMENT (OUTPATIENT)
Dept: NEUROLOGY | Facility: CLINIC | Age: 54
End: 2019-09-11
Payer: COMMERCIAL

## 2019-09-11 PROCEDURE — 93880 EXTRACRANIAL BILAT STUDY: CPT

## 2019-10-07 ENCOUNTER — APPOINTMENT (OUTPATIENT)
Dept: NEUROLOGY | Facility: CLINIC | Age: 54
End: 2019-10-07
Payer: COMMERCIAL

## 2019-10-07 VITALS
BODY MASS INDEX: 24.38 KG/M2 | OXYGEN SATURATION: 98 % | SYSTOLIC BLOOD PRESSURE: 110 MMHG | HEIGHT: 74 IN | HEART RATE: 79 BPM | WEIGHT: 190 LBS | DIASTOLIC BLOOD PRESSURE: 68 MMHG

## 2019-10-07 DIAGNOSIS — Z87.891 PERSONAL HISTORY OF NICOTINE DEPENDENCE: ICD-10-CM

## 2019-10-07 DIAGNOSIS — G43.909 MIGRAINE, UNSPECIFIED, NOT INTRACTABLE, W/OUT STATUS MIGRAINOSUS: ICD-10-CM

## 2019-10-07 PROCEDURE — 99214 OFFICE O/P EST MOD 30 MIN: CPT

## 2019-10-07 RX ORDER — BECLOMETHASONE DIPROPIONATE HFA 80 UG/1
80 AEROSOL, METERED RESPIRATORY (INHALATION)
Refills: 0 | Status: DISCONTINUED | COMMUNITY
End: 2019-10-07

## 2019-10-07 RX ORDER — CLOPIDOGREL BISULFATE 75 MG/1
75 TABLET, FILM COATED ORAL DAILY
Qty: 30 | Refills: 3 | Status: DISCONTINUED | COMMUNITY
Start: 1900-01-01 | End: 2019-10-07

## 2019-10-07 RX ORDER — CETIRIZINE HCL 10 MG
TABLET ORAL DAILY
Refills: 0 | Status: DISCONTINUED | COMMUNITY
End: 2019-10-07

## 2019-10-07 RX ORDER — ALBUTEROL SULFATE 90 UG/1
108 (90 BASE) INHALANT RESPIRATORY (INHALATION)
Refills: 0 | Status: DISCONTINUED | COMMUNITY
End: 2019-10-07

## 2019-10-07 NOTE — HISTORY OF PRESENT ILLNESS
[FreeTextEntry1] : 53 y/o right handed male patient w/pmh of smoking and cervical radiculopathy presents for follow up of TIA on 8/6/2019 and review of recent studies.  He's been active since discharge with return to surfing and cycling.  He hasn't had any additional episodes of word finding difficulty or slurred speech.  Denies any numbness, weakness or visual deficits.\par \par Stroke Prevention:\par -Reports daily adherence to Aspirin 81mg and Plavix 75mg.  He's had some bruising on his right leg after small injury.  No blood in urine or stool.\par Note: He wasn't on either Aspirin or Plavix prior to his TIA.\par -Reports daily adherence to Atorvastatin 80mg; denies any muscle cramps or pain. \par \par He completed cardiac workup with Dr. Chavira, as well as carotid dopplers and sleep study from our office.

## 2019-10-07 NOTE — ASSESSMENT
[FreeTextEntry1] : 53 y/o right handed male patient w/pmh of smoking and cervical radiculopathy presents for follow up of TIA on 8/6/2019 and review of recent studies.  Neurological examination is stable and intact.\par \par Plan for TIA\par 1) Secondary stroke prevention - \par a) Continue Aspirin 81mg for antiplatelet.  Ordered accumetrics to assess correct dose.\par - Discontinue Plavix since 90 days completed of dual antiplatelet\par b) Continue Atorvastatin 80mg\par - repeat lipid profile today with reassessment of dose LDL goal needs to be <100\par \par 2) Stroke risk factors - Main identified risk factor is smoking that he quit 6 years ago.  Rest of workup has been negative.\par \par 3) No further workup at this time.  Can consider repeat carotid dopplers in six months.\par \par 4) No contraindication to exercise.\par \par \par

## 2019-10-07 NOTE — PHYSICAL EXAM
[FreeTextEntry1] : General: sitting on exam table comfortably, NAD\par Eyes: anicteric sclera\par CV: RRR	\par Extremities: FROMx4, 2+ radial pulses bilaterally\par 			\par Neurological exam:	\par Mental status: AA&O x 3, fluent, spontaneous speech, no dysarthria, follows complex commands across midline, able give full history of present and past events, memory intact, normal attention span, fund of knowledge appropriate\par Cranial nerves: EOMI, PERRL+, VFF, facial sensation intact, no facial droop, palatal elevation intact, shoulder shrug intact bilaterally, tongue midline\par Motor: No pronator drift, 5/5 x4, normal tone and bulk\par Sensory: Intact light touch, vibration, temperature bilaterally. Negative Romberg\par Reflexes: symmetric\par Cerebellar: FNF and HKS intact bilaterally\par Gait: steady, able tandem\par \par modified iker scale = 0

## 2019-10-07 NOTE — DATA REVIEWED
[de-identified] : \par Carotid dopplers (Performed 9/11/2019): Mild wall thickening of the intima were noted at the bilateral middle CCA, without hemodynamically significant stenosis.\par  [de-identified] : Sleep studies (Performed 9/4/2019): No evidence of sleep disordered breathing.

## 2019-10-21 ENCOUNTER — TRANSCRIPTION ENCOUNTER (OUTPATIENT)
Age: 54
End: 2019-10-21

## 2019-10-24 ENCOUNTER — RESULT REVIEW (OUTPATIENT)
Age: 54
End: 2019-10-24

## 2019-10-24 LAB
CHOLEST SERPL-MCNC: 187 MG/DL
CHOLEST/HDLC SERPL: 3 RATIO
HDLC SERPL-MCNC: 63 MG/DL
LDLC SERPL CALC-MCNC: 106 MG/DL
PLATELET RESPONSE ASPIRIN: 392 ARU
TRIGL SERPL-MCNC: 92 MG/DL

## 2019-12-04 ENCOUNTER — RX RENEWAL (OUTPATIENT)
Age: 54
End: 2019-12-04

## 2020-01-10 ENCOUNTER — APPOINTMENT (OUTPATIENT)
Dept: NEUROLOGY | Facility: CLINIC | Age: 55
End: 2020-01-10
Payer: COMMERCIAL

## 2020-01-10 VITALS
SYSTOLIC BLOOD PRESSURE: 110 MMHG | TEMPERATURE: 97.8 F | DIASTOLIC BLOOD PRESSURE: 77 MMHG | BODY MASS INDEX: 24.38 KG/M2 | HEART RATE: 68 BPM | HEIGHT: 74 IN | WEIGHT: 190 LBS | OXYGEN SATURATION: 98 %

## 2020-01-10 PROCEDURE — 99214 OFFICE O/P EST MOD 30 MIN: CPT

## 2020-01-11 NOTE — CONSULT LETTER
[Dear  ___] : Dear  [unfilled], [Courtesy Letter:] : I had the pleasure of seeing your patient, [unfilled], in my office today. [FreeTextEntry2] : Dr. Birgit Ellis\par [address]\[place] [address]

## 2020-01-11 NOTE — PHYSICAL EXAM
[FreeTextEntry1] : General: sitting on exam table comfortably, NAD\par Eyes: anicteric sclera\par CV: RRR	\par Extremities: FROMx4, 2+ radial pulses bilaterally\par 			\par Neurological exam:	\par Mental status: AA&O x 3, fluent, spontaneous speech, no dysarthria, follows complex commands across midline, able give full history of present and past events, memory intact, normal attention span, fund of knowledge appropriate\par Cranial nerves: EOMI, PERRL+, VFF, facial sensation intact, no facial droop, palatal elevation intact, shoulder shrug intact bilaterally, tongue midline\par Motor: No pronator drift, 5/5 x4, normal tone and bulk\par Sensory: Intact light touch, temperature bilaterally. Negative Romberg\par Reflexes: symmetric\par Cerebellar: FNF and HKS intact bilaterally\par Gait: steady

## 2020-01-11 NOTE — HISTORY OF PRESENT ILLNESS
[FreeTextEntry1] : 54 year-old right-handed male w/PMH of cervical radiculopathy and former smoker presents for follow up of TIA on 8/6/2019.  No acute complaints.  No specific speech, numbness, weakness or visual deficits.\par \par Stroke Prevention:\par - Aspirin 81mg - No major bruising anymore.  No blood in urine or stool.\par - He had taken the Atorvastatin 80mg but didn't renew his prescription over the holidays.  No muscle cramps or pain.

## 2020-01-11 NOTE — ASSESSMENT
[FreeTextEntry1] : 54 year-old male w/pmh of cervical radiculopathy, hyperlipidemia (based on LDL) and former smoker presents for follow up of TIA on 8/6/2019.  Neurological examination is intact.\par \par Plan for TIA\par 1) Secondary stroke prevention - \par a) Continue Aspirin 81mg for antiplatelet. \par b) Restart Atorvastatin 80mg for statin.  Discussed the importance of statins for control of his cholesterol and blood vessel wall.\par \par 2) Stroke risk factors - \par a) Hyperlipidemia - His LDL improved but is still greater than his goal of less than 100.  Therefore, recommended that he consider changes to his diet to improve his cholesterol value further.  Referred him to Nutritionist for assistance.  Repeat lipid profile prior to next visit.\par b) Former smoker\par \par 3) Further workup - Repeat carotid dopplers prior to next visit.\par \par Thank you for allowing me to participate in the care of your patient.  If you have any questions, please feel free to call me at [phone].

## 2020-01-13 ENCOUNTER — TRANSCRIPTION ENCOUNTER (OUTPATIENT)
Age: 55
End: 2020-01-13

## 2020-03-09 ENCOUNTER — RX RENEWAL (OUTPATIENT)
Age: 55
End: 2020-03-09

## 2020-04-08 ENCOUNTER — RX RENEWAL (OUTPATIENT)
Age: 55
End: 2020-04-08

## 2020-05-12 ENCOUNTER — APPOINTMENT (OUTPATIENT)
Dept: INTERNAL MEDICINE | Facility: CLINIC | Age: 55
End: 2020-05-12
Payer: COMMERCIAL

## 2020-05-12 VITALS — BODY MASS INDEX: 24 KG/M2 | WEIGHT: 187 LBS | HEIGHT: 74 IN

## 2020-05-12 PROCEDURE — 97802 MEDICAL NUTRITION INDIV IN: CPT | Mod: 95

## 2020-07-06 ENCOUNTER — TRANSCRIPTION ENCOUNTER (OUTPATIENT)
Age: 55
End: 2020-07-06

## 2020-07-10 ENCOUNTER — APPOINTMENT (OUTPATIENT)
Dept: NEUROLOGY | Facility: CLINIC | Age: 55
End: 2020-07-10

## 2020-07-20 ENCOUNTER — APPOINTMENT (OUTPATIENT)
Dept: NEUROLOGY | Facility: CLINIC | Age: 55
End: 2020-07-20
Payer: COMMERCIAL

## 2020-07-20 VITALS
SYSTOLIC BLOOD PRESSURE: 147 MMHG | HEART RATE: 88 BPM | HEIGHT: 74 IN | OXYGEN SATURATION: 98 % | WEIGHT: 192 LBS | TEMPERATURE: 98.4 F | DIASTOLIC BLOOD PRESSURE: 82 MMHG | BODY MASS INDEX: 24.64 KG/M2

## 2020-07-20 LAB
CHOLEST SERPL-MCNC: 231 MG/DL
CHOLEST/HDLC SERPL: 3 RATIO
HDLC SERPL-MCNC: 77 MG/DL
LDLC SERPL CALC-MCNC: 140 MG/DL
TRIGL SERPL-MCNC: 72 MG/DL

## 2020-07-20 PROCEDURE — 99214 OFFICE O/P EST MOD 30 MIN: CPT

## 2020-07-20 PROCEDURE — 93880 EXTRACRANIAL BILAT STUDY: CPT

## 2020-07-20 RX ORDER — ASPIRIN 81 MG/1
81 TABLET, COATED ORAL
Qty: 90 | Refills: 3 | Status: ACTIVE | COMMUNITY
Start: 2020-03-09 | End: 1900-01-01

## 2020-07-20 NOTE — ASSESSMENT
[FreeTextEntry1] : 55 year-old male w/pmh of cervical radiculopathy, hyperlipidemia (based on LDL) and former smoker presents for follow up of TIA on 8/6/2019.  Neurological examination is intact.\par \par Plan for TIA\par 1) Secondary stroke prevention - He will probably need to be on these for life.\par a) Continue Aspirin 81mg for antiplatelet. \par b) Restart Atorvastatin 80mg for statin.  Discussed the importance of statins for control of his cholesterol and blood vessel wall.  He agrees now that he understands that diet alone doesn't control his LDL.  LDL goal is less than 100.   Repeat lipid profile prior to next visit.\par \par 2) Stroke risk factors - \par a) Hyperlipidemia - see above\par b) Former smoker\par \par 3) Further workup - Repeat carotid dopplers in one year.\par \par Thank you for allowing me to participate in the care of your patient.  If you have any questions, please feel free to call me at [phone].

## 2020-07-20 NOTE — DATA REVIEWED
[de-identified] : Prelim read of carotid dopplers performed earlier today - Normal.  No evidencee of stenosis or occlusion.\par  [de-identified] : Labs (7/17/2020): \par Lipid profile: , HDL 77, Triglycerides 72, Total 231

## 2020-07-20 NOTE — HISTORY OF PRESENT ILLNESS
[FreeTextEntry1] : 55 year-old right-handed male w/PMH of cervical radiculopathy and former smoker presents for follow up of TIA on 8/6/2019.  No acute complaints.  No specific speech, numbness, weakness or visual deficits.  He's been more physically active during the pandemic.\par \par Stroke Prevention:\par - Aspirin 81mg - No blood in urine or stool.\par - He discontinued statin since out of town and eating healthier than usual.  He also had previously seen Nutritionist. He would prefer not to be on meds.  Had repeat lipid profile this past week - \par - LDL increased from 106 (10/2019) to 140 and total from 187 (10/2019) to 231.\par \par He had repeat carotid dopplers today and labs last week..

## 2020-10-19 ENCOUNTER — APPOINTMENT (OUTPATIENT)
Dept: NEUROLOGY | Facility: CLINIC | Age: 55
End: 2020-10-19
Payer: COMMERCIAL

## 2020-10-19 VITALS
SYSTOLIC BLOOD PRESSURE: 130 MMHG | TEMPERATURE: 98.4 F | WEIGHT: 196 LBS | OXYGEN SATURATION: 97 % | HEART RATE: 70 BPM | HEIGHT: 74 IN | DIASTOLIC BLOOD PRESSURE: 81 MMHG | BODY MASS INDEX: 25.15 KG/M2

## 2020-10-19 DIAGNOSIS — E78.49 OTHER HYPERLIPIDEMIA: ICD-10-CM

## 2020-10-19 DIAGNOSIS — G45.9 TRANSIENT CEREBRAL ISCHEMIC ATTACK, UNSPECIFIED: ICD-10-CM

## 2020-10-19 PROCEDURE — 99072 ADDL SUPL MATRL&STAF TM PHE: CPT

## 2020-10-19 PROCEDURE — 99213 OFFICE O/P EST LOW 20 MIN: CPT

## 2020-10-19 RX ORDER — ATORVASTATIN CALCIUM 80 MG/1
80 TABLET, FILM COATED ORAL
Qty: 90 | Refills: 3 | Status: DISCONTINUED | COMMUNITY
Start: 2020-07-20 | End: 2020-10-19

## 2020-10-19 NOTE — DATA REVIEWED
[de-identified] : \par Carotid dopplers (PErformed 10/19/2020): Normal.  No evidence of stenosis or occlusion.\par  [de-identified] : Labs (10/2020):  Lipid profile: LDL 67, HDL 57, Triglycerides 81, Total 141

## 2020-10-19 NOTE — PHYSICAL EXAM
[FreeTextEntry1] : General: sitting on exam table comfortably, NAD\par Eyes: anicteric sclera\par CV: RRR	\par Extremities: FROMx4, 2+ radial pulses bilaterally\par 			\par Neurological exam:	\par Mental status: AA&O x 3, fluent, spontaneous speech, no dysarthria, follows complex commands across midline, able give full history of present and past events, memory intact, normal attention span, fund of knowledge appropriate\par Cranial nerves: EOMI, PERRL+, VFF, facial sensation intact, no facial droop, palatal elevation intact, shoulder shrug intact bilaterally, tongue midline\par Motor: No pronator drift, 5/5 x4, normal tone and bulk\par Sensory: Intact light touch, temperature bilaterally. Negative Romberg\par Reflexes: symmetric\par Cerebellar: FNF and HKS intact bilaterally\par Gait: steady, able tandem, toe and heel walk

## 2020-10-19 NOTE — ASSESSMENT
[FreeTextEntry1] : 55 year-old male w/pmh of cervical radiculopathy, hyperlipidemia (based on LDL) and former smoker presents for follow up of TIA on 8/6/2019.  Neurological examination is intact.\par \par Plan for TIA\par 1) Secondary stroke prevention - He will probably need to be on these for life.\par a) Continue Aspirin 81mg for antiplatelet. \par b) Decrease dose of Atorvastatin to 40mg for statin given his response on recent lipid profile.  He agrees with the statin now that he sees the results.  LDL goal is less than 100.  \par \par 2) Stroke risk factors - \par a) Hyperlipidemia - see above\par b) Former smoker\par \par 3) Further workup - Repeat carotid dopplers in one year.

## 2020-10-19 NOTE — HISTORY OF PRESENT ILLNESS
[FreeTextEntry1] : 55 year-old right-handed male w/PMH of cervical radiculopathy and former smoker presents for follow up of TIA on 8/6/2019 that presented with speech difficulties.  No acute complaints.  No specific speech, numbness, weakness or visual deficits. \par \par Stroke Prevention:\par - Aspirin 81mg - No blood in urine or stool.\par - He's taking the Atorvastatin with minimal cramps.  Repeat lipid profile this past week - LDL decreased from 140 to 67.\par \par He had repeat carotid dopplers today and labs last week.

## 2020-10-20 LAB
CHOLEST SERPL-MCNC: 141 MG/DL
CHOLEST/HDLC SERPL: 2.5 RATIO
HDLC SERPL-MCNC: 57 MG/DL
LDLC SERPL CALC-MCNC: 67 MG/DL
TRIGL SERPL-MCNC: 81 MG/DL

## 2021-01-19 RX ORDER — ATORVASTATIN CALCIUM 40 MG/1
40 TABLET, FILM COATED ORAL DAILY
Qty: 90 | Refills: 1 | Status: ACTIVE | COMMUNITY
Start: 2020-10-19 | End: 1900-01-01

## 2021-05-19 NOTE — ED ADULT TRIAGE NOTE - CHIEF COMPLAINT QUOTE
patient BIBA from urgent care. he states that a little after 5 PM today he had right eye vision change and could not understand the emails he was reading. patient went to urgent care and was sent to ER for eval. he states now that he feels  generalized weakness but vision and confusion symptoms have resolved. he states that he recently had blood work done and was told he is hyponatremic but denies any other significant medical problems.  Normal

## 2021-11-10 ENCOUNTER — TRANSCRIPTION ENCOUNTER (OUTPATIENT)
Age: 56
End: 2021-11-10

## 2023-02-23 NOTE — PROGRESS NOTE ADULT - SUBJECTIVE AND OBJECTIVE BOX
Physical Medicine and Rehabilitation Progress Note:    Patient is a 54y old  Male who presents with a chief complaint of Confusion (08 Aug 2019 13:03)      HPI:  Mr. Howard is a 54 year old male with a PMHx of hyponatremia (worked up as an outpatient), cervical radiculopathy with paresthesias of the L hand, post nasal drip (takes zyrtec, benadryl, and inhaler), and tobacco use disorder (~20 pack years, quit 5 years ago, screening CT chest 1 year ago). Today, he was working when at 5:30 pm he noticed a bright light in his right eye, and had difficulty understanding his emails. He called his girlfriend, who could not understand him, and she advised him to go to the ED. He decided to walk to an urgent care center & felt "not quite drunk" while walking there. While waiting at the urgent care, his symptoms resolved (duration ~1.5 hrs). He was referred to the [facility] ED from the urgent care center.    ED Course: VS HR 67, /94, RR 18, SpO2 98% on room air. Stroke code initiated. Received aspirin 81, plavix 75, & atorvastatin 80, CT (negative for acute hemorrhage or perfusion defect), UA negative, BMP w/Na 131 & Cl 95.     Arrived @ the floor & given lovenox 40 sc, albuterol, and fluticasone. (06 Aug 2019 22:22)                            14.6   7.89  )-----------( 303      ( 07 Aug 2019 06:30 )             42.8       08-07    134<L>  |  99  |  11  ----------------------------<  99  4.6   |  25  |  1.01    Ca    8.7      07 Aug 2019 06:30    TPro  7.0  /  Alb  4.4  /  TBili  0.9  /  DBili  x   /  AST  25  /  ALT  27  /  AlkPhos  67  08-06    Vital Signs Last 24 Hrs  T(C): 36.7 (08 Aug 2019 09:14), Max: 36.9 (08 Aug 2019 05:30)  T(F): 98 (08 Aug 2019 09:14), Max: 98.4 (08 Aug 2019 05:30)  HR: 74 (08 Aug 2019 11:38) (65 - 80)  BP: 124/72 (08 Aug 2019 11:38) (117/77 - 126/84)  BP(mean): 90 (08 Aug 2019 11:38) (90 - 94)  RR: 18 (08 Aug 2019 11:38) (16 - 20)  SpO2: 99% (08 Aug 2019 11:38) (95% - 99%)    MEDICATIONS  (STANDING):  aspirin enteric coated 81 milliGRAM(s) Oral daily  atorvastatin 80 milliGRAM(s) Oral at bedtime  clopidogrel Tablet 75 milliGRAM(s) Oral daily  enoxaparin Injectable 40 milliGRAM(s) SubCutaneous every 24 hours  fluticasone propionate 50 MICROgram(s)/spray Nasal Spray 1 Spray(s) Both Nostrils two times a day    MEDICATIONS  (PRN):  acetaminophen   Tablet .. 650 milliGRAM(s) Oral every 6 hours PRN Mild Pain (1 - 3)  ALBUTerol    90 MICROgram(s) HFA Inhaler 1 Puff(s) Inhalation every 8 hours PRN Shortness of Breath and/or Wheezing  benzocaine 15 mG/menthol 3.6 mG Lozenge 1 Lozenge Oral every 2 hours PRN Sore Throat  sodium chloride 0.65% Nasal 1 Spray(s) Both Nostrils every 4 hours PRN Nasal Congestion    Currently Undergoing Physical Therapy at bedside.    Functional Status Assessment:    Previous Level of Function:     · Ambulation Skills	independent	  · Transfer Skills	independent	  · ADL Skills	independent	  · Work/Leisure Activity	independent	  · Additional Comments	Pt lives alone in an apartment with 5 flights walk up. Prior to admission, pt ambulated independently without assistive device.	    Cognitive Status Examination:   · Orientation	oriented to person, place, time and situation	  · Level of Consciousness	alert	  · Follows Commands and Answers Questions	100% of the time; able to follow multistep instructions; able to follow single-step instructions; Pt able to do sequential subtraction of 7 from 100 , able to spell the word "World" backward	  · Personal Safety and Judgment	intact	  · Short Term Memory	intact  recall 3/3 words after 1 min	  · Long Term Memory	intact  recall 3/3 words after 15 mins	    Range of Motion Exam:   · Active Range of Motion Examination	no Active ROM deficits were identified	    Manual Muscle Testing:   · Manual Muscle Testing Results	b/l UEs ~5/5 through out, b/l LEs ~5/5 through out	    Muscle Tone Assessment:   · Muscle Tone Assessment	normal	    Bed Mobility: Scooting/Bridging:     · Level of [place]	independent	    Bed Mobility: Sit to Supine:     · Level of [place]	independent	    Bed Mobility: Supine to Sit:     · Level of [place]	independent	    Transfer: Sit to Stand:     · Level of [place]	independent	    Transfer: Stand to Sit:     · Level of [place]	independent	    Gait Skills:     · Level of [place]	independent	  · Assistive Device	none	  · Gait Distance	200 feet	    Gait Analysis:     · Gait Pattern Used	swing-through gait	  · Gait Deviations Noted	steady gait, no lose of balance	    Stair Negotiation:     · Level of [place]	mod I	  · Assistive Device	right rail up; right rail down	  · Stair Pattern	step over step	  · Number of Stairs	28	    Balance Skills Assessment:     · Sitting Balance: Static	normal balance	  · Sitting Balance: Dynamic	normal balance	  · Sit-to-Stand Balance	normal balance	  · Standing Balance: Static	normal balance	  · Standing Balance: Dynamic	normal balance	    Sensory Examination:   Sensory Examination:    Light Touch Sensation:   · Left UE	within normal limits	  · Right UE	within normal limits	  · Left LE	within normal limits	  · Right LE	within normal limits	    · Coordination Assessed	finger to nose; heel to shin; intact bilaterally	      Proprioception:   · Coordination Assessed	finger to nose; heel to shin; intact bilaterally	      Treatment Location:   · Comments	--- hand dominant; (R) CN Testing: B/L Frontalis intact; B/L buccinator intact; smile symmetrical; tongue protrusion at midline; B/L eyes open/close intact; Shoulder elevation: intact bilaterally; Vision H-Test: bilateral tracking and smooth pursuit intact; Convergence/Divergence: intact; Vision Quadrant Test: intact bilaterally	          < from: MR Head No Cont (08.07.19 @ 20:13) >  EXAM:  MR BRAIN                          PROCEDURE DATE:  08/07/2019          INTERPRETATION:  Rule out CVA.    Technique: MRI of the brain was performed utilizing sagittal and axial   T1, axial T2, axial gradient echo, axial  FLAIR and diffusion imaging.    There are no prior studies available for comparison.    Findings: There is motion artifact that limits this evaluation. There are   a few scattered punctate foci of T2 and Flair signal hyperintensities   within the white matter that are nonspecific. There is no evidence of   mass-effect or midline shift. There is no evidence of intra or   extra-axial fluid collection. The ventricles are of normal size and   caliber. There is no evidence of acute infarction. Evaluation of the   intracranial vascular flow-voids appear within normal limits.    There is minimal bilateral frontal and mild maxillary mucosal thickening.   There is a small air-fluid level seen in the left frontal sinus. There is   near complete opacification of the bilateral ethmoid air cells.   Visualized paranasal sinuses and bilateral mastoid air cells are clear.    Impression: Nonspecific few scattered punctate foci of T2 and Flair   signal hyperintensities within the white matter; findings may be seen in   patient with migraine headaches, vasculitis, microvascular disease,   demyelinating disease, Lyme disease and viral illness.  No evidence of   acute infarction.          Marked ethmoid, mild to moderate maxillary and frontal sinus inflammatory   disease.            PM&R Impression: as above    Disposition Plan Recommendations: d/c home with no post discharge rehab needs Home

## 2025-06-11 NOTE — CONSULT NOTE ADULT - REASON FOR ADMISSION
HISTORY OF PRESENT ILLNESS  Chief Complaint   Patient presents with    Medication Refill    Weight Management     Weight had been fluctuating. Not taking phentermine due to mood issues.      NOTICE FOR THE PATIENT: This clinical note is not designed to be interpreted by patients.  We do not recommend reading it unless you have medical training. These notes may contain candid and (unintentionally) offensive descriptions, which are sometimes required for accurate documentation. If you would like more information about your healthcare, please obtain it directly by myself or my staff/colleagues - never solely from the notes. Thank you for your understanding and cooperation.     Weight Management - Weight had been fluctuating. Not taking phentermine due to mood issues.    Flare up of migraines    PHQ-9: Over the past 2 weeks, how often have you been bothered by any of the following problems?  Little interest or pleasure in doing things: Several days  Feeling down, depressed, or hopeless: Several days  Trouble falling or staying asleep, or sleeping too much: Nearly every day  Feeling tired or having little energy: Nearly every day  Poor appetite or overeating: Nearly every day  Feeling bad about yourself - or that you are a failure or have let yourself or your family down: Not at all  Trouble concentrating on things, such as reading the newspaper or watching television: Not at all  Moving or speaking so slowly that other people could have noticed. Or the opposite - being so fidgety or restless that you have been moving around a lot more than usual: Not at all  Thoughts that you would be better off dead, or of hurting yourself in some way: Not at all  PHQ-9 Total Score: 11      Wt Readings from Last 3 Encounters:   06/11/25 97.5 kg (215 lb)   05/17/25 87.1 kg (192 lb)   01/27/25 86.2 kg (190 lb)      BP Readings from Last 3 Encounters:   06/11/25 121/81   05/17/25 (!) 141/76   01/28/25 137/83            History of 
Confusion